# Patient Record
Sex: MALE | Race: WHITE | NOT HISPANIC OR LATINO | Employment: FULL TIME | URBAN - METROPOLITAN AREA
[De-identification: names, ages, dates, MRNs, and addresses within clinical notes are randomized per-mention and may not be internally consistent; named-entity substitution may affect disease eponyms.]

---

## 2018-11-28 ENCOUNTER — HOSPITAL ENCOUNTER (INPATIENT)
Facility: HOSPITAL | Age: 53
LOS: 2 days | Discharge: HOME | DRG: 390 | End: 2018-11-30
Attending: EMERGENCY MEDICINE | Admitting: SURGERY
Payer: COMMERCIAL

## 2018-11-28 ENCOUNTER — APPOINTMENT (EMERGENCY)
Dept: RADIOLOGY | Facility: HOSPITAL | Age: 53
DRG: 390 | End: 2018-11-28
Payer: COMMERCIAL

## 2018-11-28 ENCOUNTER — APPOINTMENT (EMERGENCY)
Dept: CT IMAGING | Facility: HOSPITAL | Age: 53
DRG: 390 | End: 2018-11-28
Payer: COMMERCIAL

## 2018-11-28 DIAGNOSIS — I10 HYPERTENSION, UNSPECIFIED TYPE: ICD-10-CM

## 2018-11-28 DIAGNOSIS — K56.609 SMALL BOWEL OBSTRUCTION (HCC): Primary | ICD-10-CM

## 2018-11-28 PROBLEM — G25.81 RLS (RESTLESS LEGS SYNDROME): Status: ACTIVE | Noted: 2018-11-28

## 2018-11-28 LAB
ALBUMIN SERPL BCP-MCNC: 4.4 G/DL (ref 3.5–5)
ALP SERPL-CCNC: 64 U/L (ref 46–116)
ALT SERPL W P-5'-P-CCNC: 50 U/L (ref 12–78)
AMORPH PHOS CRY URNS QL MICRO: ABNORMAL /HPF
ANION GAP SERPL CALCULATED.3IONS-SCNC: 13 MMOL/L (ref 4–13)
ANION GAP SERPL CALCULATED.3IONS-SCNC: 8 MMOL/L (ref 4–13)
APTT PPP: 26 SECONDS (ref 26–38)
AST SERPL W P-5'-P-CCNC: 31 U/L (ref 5–45)
ATRIAL RATE: 88 BPM
BACTERIA UR QL AUTO: ABNORMAL /HPF
BASOPHILS # BLD AUTO: 0.06 THOUSANDS/ΜL (ref 0–0.1)
BASOPHILS # BLD AUTO: 0.1 THOUSANDS/ΜL (ref 0–0.1)
BASOPHILS NFR BLD AUTO: 1 % (ref 0–1)
BASOPHILS NFR BLD AUTO: 1 % (ref 0–1)
BILIRUB SERPL-MCNC: 0.7 MG/DL (ref 0.2–1)
BILIRUB UR QL STRIP: NEGATIVE
BUN SERPL-MCNC: 13 MG/DL (ref 5–25)
BUN SERPL-MCNC: 14 MG/DL (ref 5–25)
CALCIUM SERPL-MCNC: 10.2 MG/DL (ref 8.3–10.1)
CALCIUM SERPL-MCNC: 9.2 MG/DL (ref 8.3–10.1)
CHLORIDE SERPL-SCNC: 100 MMOL/L (ref 100–108)
CHLORIDE SERPL-SCNC: 104 MMOL/L (ref 100–108)
CLARITY UR: CLEAR
CO2 SERPL-SCNC: 26 MMOL/L (ref 21–32)
CO2 SERPL-SCNC: 26 MMOL/L (ref 21–32)
COLOR UR: YELLOW
CREAT SERPL-MCNC: 0.91 MG/DL (ref 0.6–1.3)
CREAT SERPL-MCNC: 1 MG/DL (ref 0.6–1.3)
EOSINOPHIL # BLD AUTO: 0.02 THOUSAND/ΜL (ref 0–0.61)
EOSINOPHIL # BLD AUTO: 0.1 THOUSAND/ΜL (ref 0–0.61)
EOSINOPHIL NFR BLD AUTO: 0 % (ref 0–6)
EOSINOPHIL NFR BLD AUTO: 1 % (ref 0–6)
ERYTHROCYTE [DISTWIDTH] IN BLOOD BY AUTOMATED COUNT: 12.7 % (ref 11.6–15.1)
ERYTHROCYTE [DISTWIDTH] IN BLOOD BY AUTOMATED COUNT: 12.9 % (ref 11.6–15.1)
GFR SERPL CREATININE-BSD FRML MDRD: 85 ML/MIN/1.73SQ M
GFR SERPL CREATININE-BSD FRML MDRD: 95 ML/MIN/1.73SQ M
GLUCOSE SERPL-MCNC: 123 MG/DL (ref 65–140)
GLUCOSE SERPL-MCNC: 136 MG/DL (ref 65–140)
GLUCOSE UR STRIP-MCNC: NEGATIVE MG/DL
HCT VFR BLD AUTO: 49.3 % (ref 36.5–49.3)
HCT VFR BLD AUTO: 51.4 % (ref 36.5–49.3)
HGB BLD-MCNC: 16.9 G/DL (ref 12–17)
HGB BLD-MCNC: 18.3 G/DL (ref 12–17)
HGB UR QL STRIP.AUTO: NEGATIVE
IMM GRANULOCYTES # BLD AUTO: 0.03 THOUSAND/UL (ref 0–0.2)
IMM GRANULOCYTES # BLD AUTO: 0.03 THOUSAND/UL (ref 0–0.2)
IMM GRANULOCYTES NFR BLD AUTO: 0 % (ref 0–2)
IMM GRANULOCYTES NFR BLD AUTO: 0 % (ref 0–2)
INR PPP: 1.03 (ref 0.86–1.17)
KETONES UR STRIP-MCNC: NEGATIVE MG/DL
LACTATE SERPL-SCNC: 1.7 MMOL/L (ref 0.5–2)
LEUKOCYTE ESTERASE UR QL STRIP: NEGATIVE
LIPASE SERPL-CCNC: 105 U/L (ref 73–393)
LYMPHOCYTES # BLD AUTO: 1 THOUSANDS/ΜL (ref 0.6–4.47)
LYMPHOCYTES # BLD AUTO: 1.67 THOUSANDS/ΜL (ref 0.6–4.47)
LYMPHOCYTES NFR BLD AUTO: 11 % (ref 14–44)
LYMPHOCYTES NFR BLD AUTO: 8 % (ref 14–44)
MAGNESIUM SERPL-MCNC: 2.5 MG/DL (ref 1.6–2.6)
MCH RBC QN AUTO: 31 PG (ref 26.8–34.3)
MCH RBC QN AUTO: 31.4 PG (ref 26.8–34.3)
MCHC RBC AUTO-ENTMCNC: 34.3 G/DL (ref 31.4–37.4)
MCHC RBC AUTO-ENTMCNC: 35.6 G/DL (ref 31.4–37.4)
MCV RBC AUTO: 88 FL (ref 82–98)
MCV RBC AUTO: 90 FL (ref 82–98)
MONOCYTES # BLD AUTO: 0.52 THOUSAND/ΜL (ref 0.17–1.22)
MONOCYTES # BLD AUTO: 0.66 THOUSAND/ΜL (ref 0.17–1.22)
MONOCYTES NFR BLD AUTO: 4 % (ref 4–12)
MONOCYTES NFR BLD AUTO: 5 % (ref 4–12)
NEUTROPHILS # BLD AUTO: 11.07 THOUSANDS/ΜL (ref 1.85–7.62)
NEUTROPHILS # BLD AUTO: 12.38 THOUSANDS/ΜL (ref 1.85–7.62)
NEUTS SEG NFR BLD AUTO: 83 % (ref 43–75)
NEUTS SEG NFR BLD AUTO: 86 % (ref 43–75)
NITRITE UR QL STRIP: NEGATIVE
NON-SQ EPI CELLS URNS QL MICRO: ABNORMAL /HPF
NRBC BLD AUTO-RTO: 0 /100 WBCS
NRBC BLD AUTO-RTO: 0 /100 WBCS
P AXIS: 64 DEGREES
PH UR STRIP.AUTO: 8.5 [PH] (ref 4.5–8)
PLATELET # BLD AUTO: 241 THOUSANDS/UL (ref 149–390)
PLATELET # BLD AUTO: 279 THOUSANDS/UL (ref 149–390)
PMV BLD AUTO: 9.5 FL (ref 8.9–12.7)
PMV BLD AUTO: 9.6 FL (ref 8.9–12.7)
POTASSIUM SERPL-SCNC: 4 MMOL/L (ref 3.5–5.3)
POTASSIUM SERPL-SCNC: 4.3 MMOL/L (ref 3.5–5.3)
PR INTERVAL: 158 MS
PROT SERPL-MCNC: 9.1 G/DL (ref 6.4–8.2)
PROT UR STRIP-MCNC: ABNORMAL MG/DL
PROTHROMBIN TIME: 13.2 SECONDS (ref 11.8–14.2)
QRS AXIS: 74 DEGREES
QRSD INTERVAL: 96 MS
QT INTERVAL: 360 MS
QTC INTERVAL: 428 MS
RBC # BLD AUTO: 5.46 MILLION/UL (ref 3.88–5.62)
RBC # BLD AUTO: 5.82 MILLION/UL (ref 3.88–5.62)
RBC #/AREA URNS AUTO: ABNORMAL /HPF
SODIUM SERPL-SCNC: 138 MMOL/L (ref 136–145)
SODIUM SERPL-SCNC: 139 MMOL/L (ref 136–145)
SP GR UR STRIP.AUTO: 1.02 (ref 1–1.03)
T WAVE AXIS: 51 DEGREES
UROBILINOGEN UR QL STRIP.AUTO: 0.2 E.U./DL
VENTRICULAR RATE: 85 BPM
WBC # BLD AUTO: 12.84 THOUSAND/UL (ref 4.31–10.16)
WBC # BLD AUTO: 14.8 THOUSAND/UL (ref 4.31–10.16)
WBC #/AREA URNS AUTO: ABNORMAL /HPF

## 2018-11-28 PROCEDURE — 80053 COMPREHEN METABOLIC PANEL: CPT | Performed by: EMERGENCY MEDICINE

## 2018-11-28 PROCEDURE — 83605 ASSAY OF LACTIC ACID: CPT | Performed by: EMERGENCY MEDICINE

## 2018-11-28 PROCEDURE — 85730 THROMBOPLASTIN TIME PARTIAL: CPT | Performed by: EMERGENCY MEDICINE

## 2018-11-28 PROCEDURE — 83735 ASSAY OF MAGNESIUM: CPT | Performed by: SURGERY

## 2018-11-28 PROCEDURE — 93005 ELECTROCARDIOGRAM TRACING: CPT

## 2018-11-28 PROCEDURE — 85610 PROTHROMBIN TIME: CPT | Performed by: EMERGENCY MEDICINE

## 2018-11-28 PROCEDURE — 74177 CT ABD & PELVIS W/CONTRAST: CPT

## 2018-11-28 PROCEDURE — C9113 INJ PANTOPRAZOLE SODIUM, VIA: HCPCS | Performed by: SURGERY

## 2018-11-28 PROCEDURE — 93010 ELECTROCARDIOGRAM REPORT: CPT | Performed by: INTERNAL MEDICINE

## 2018-11-28 PROCEDURE — 71045 X-RAY EXAM CHEST 1 VIEW: CPT

## 2018-11-28 PROCEDURE — 96374 THER/PROPH/DIAG INJ IV PUSH: CPT

## 2018-11-28 PROCEDURE — 81001 URINALYSIS AUTO W/SCOPE: CPT

## 2018-11-28 PROCEDURE — 96375 TX/PRO/DX INJ NEW DRUG ADDON: CPT

## 2018-11-28 PROCEDURE — 83690 ASSAY OF LIPASE: CPT | Performed by: EMERGENCY MEDICINE

## 2018-11-28 PROCEDURE — 36415 COLL VENOUS BLD VENIPUNCTURE: CPT | Performed by: EMERGENCY MEDICINE

## 2018-11-28 PROCEDURE — 85025 COMPLETE CBC W/AUTO DIFF WBC: CPT | Performed by: EMERGENCY MEDICINE

## 2018-11-28 PROCEDURE — 99285 EMERGENCY DEPT VISIT HI MDM: CPT

## 2018-11-28 PROCEDURE — 96361 HYDRATE IV INFUSION ADD-ON: CPT

## 2018-11-28 PROCEDURE — 85025 COMPLETE CBC W/AUTO DIFF WBC: CPT | Performed by: SURGERY

## 2018-11-28 PROCEDURE — 96376 TX/PRO/DX INJ SAME DRUG ADON: CPT

## 2018-11-28 PROCEDURE — 80048 BASIC METABOLIC PNL TOTAL CA: CPT | Performed by: SURGERY

## 2018-11-28 RX ORDER — AMLODIPINE BESYLATE 5 MG/1
5 TABLET ORAL DAILY
Status: DISCONTINUED | OUTPATIENT
Start: 2018-11-28 | End: 2018-11-28

## 2018-11-28 RX ORDER — HYDROMORPHONE HCL/PF 1 MG/ML
0.5 SYRINGE (ML) INJECTION ONCE
Status: COMPLETED | OUTPATIENT
Start: 2018-11-28 | End: 2018-11-28

## 2018-11-28 RX ORDER — HYDROMORPHONE HCL/PF 1 MG/ML
1 SYRINGE (ML) INJECTION ONCE
Status: COMPLETED | OUTPATIENT
Start: 2018-11-28 | End: 2018-11-28

## 2018-11-28 RX ORDER — ROPINIROLE 5 MG/1
0.5 TABLET, FILM COATED ORAL 3 TIMES DAILY
COMMUNITY

## 2018-11-28 RX ORDER — HYDRALAZINE HYDROCHLORIDE 20 MG/ML
10 INJECTION INTRAMUSCULAR; INTRAVENOUS EVERY 6 HOURS PRN
Status: DISCONTINUED | OUTPATIENT
Start: 2018-11-28 | End: 2018-11-30 | Stop reason: HOSPADM

## 2018-11-28 RX ORDER — HYDROMORPHONE HCL/PF 1 MG/ML
0.5 SYRINGE (ML) INJECTION
Status: DISCONTINUED | OUTPATIENT
Start: 2018-11-28 | End: 2018-11-28

## 2018-11-28 RX ORDER — SODIUM CHLORIDE 9 MG/ML
125 INJECTION, SOLUTION INTRAVENOUS CONTINUOUS
Status: DISCONTINUED | OUTPATIENT
Start: 2018-11-28 | End: 2018-11-28 | Stop reason: HOSPADM

## 2018-11-28 RX ORDER — PANTOPRAZOLE SODIUM 40 MG/1
40 INJECTION, POWDER, FOR SOLUTION INTRAVENOUS
Status: DISCONTINUED | OUTPATIENT
Start: 2018-11-28 | End: 2018-11-30 | Stop reason: HOSPADM

## 2018-11-28 RX ORDER — METOPROLOL TARTRATE 5 MG/5ML
5 INJECTION INTRAVENOUS EVERY 8 HOURS PRN
Status: DISCONTINUED | OUTPATIENT
Start: 2018-11-28 | End: 2018-11-30 | Stop reason: HOSPADM

## 2018-11-28 RX ORDER — KETAMINE HYDROCHLORIDE 50 MG/ML
25 INJECTION, SOLUTION, CONCENTRATE INTRAMUSCULAR; INTRAVENOUS ONCE
Status: COMPLETED | OUTPATIENT
Start: 2018-11-28 | End: 2018-11-28

## 2018-11-28 RX ORDER — SODIUM CHLORIDE 9 MG/ML
125 INJECTION, SOLUTION INTRAVENOUS CONTINUOUS
Status: DISCONTINUED | OUTPATIENT
Start: 2018-11-28 | End: 2018-11-29

## 2018-11-28 RX ORDER — DOCUSATE SODIUM 100 MG/1
100 CAPSULE, LIQUID FILLED ORAL 2 TIMES DAILY PRN
Status: DISCONTINUED | OUTPATIENT
Start: 2018-11-28 | End: 2018-11-28

## 2018-11-28 RX ORDER — ONDANSETRON 2 MG/ML
4 INJECTION INTRAMUSCULAR; INTRAVENOUS EVERY 4 HOURS PRN
Status: DISCONTINUED | OUTPATIENT
Start: 2018-11-28 | End: 2018-11-30 | Stop reason: HOSPADM

## 2018-11-28 RX ORDER — DIAZEPAM 5 MG/ML
2.5 INJECTION, SOLUTION INTRAMUSCULAR; INTRAVENOUS EVERY 6 HOURS PRN
Status: DISCONTINUED | OUTPATIENT
Start: 2018-11-28 | End: 2018-11-30 | Stop reason: HOSPADM

## 2018-11-28 RX ORDER — ROPINIROLE 0.25 MG/1
0.5 TABLET, FILM COATED ORAL 3 TIMES DAILY
Status: DISCONTINUED | OUTPATIENT
Start: 2018-11-28 | End: 2018-11-28

## 2018-11-28 RX ORDER — LORAZEPAM 2 MG/ML
1 INJECTION INTRAMUSCULAR EVERY 4 HOURS PRN
Status: DISCONTINUED | OUTPATIENT
Start: 2018-11-28 | End: 2018-11-30 | Stop reason: HOSPADM

## 2018-11-28 RX ORDER — HYDROMORPHONE HCL/PF 1 MG/ML
0.5 SYRINGE (ML) INJECTION EVERY 4 HOURS PRN
Status: DISCONTINUED | OUTPATIENT
Start: 2018-11-28 | End: 2018-11-28

## 2018-11-28 RX ORDER — DIPHENHYDRAMINE HYDROCHLORIDE 50 MG/ML
25 INJECTION INTRAMUSCULAR; INTRAVENOUS EVERY 6 HOURS PRN
Status: DISCONTINUED | OUTPATIENT
Start: 2018-11-28 | End: 2018-11-30 | Stop reason: HOSPADM

## 2018-11-28 RX ORDER — HYDROMORPHONE HCL/PF 1 MG/ML
1 SYRINGE (ML) INJECTION
Status: DISCONTINUED | OUTPATIENT
Start: 2018-11-28 | End: 2018-11-30

## 2018-11-28 RX ORDER — MIDAZOLAM HYDROCHLORIDE 1 MG/ML
2 INJECTION INTRAMUSCULAR; INTRAVENOUS ONCE
Status: DISCONTINUED | OUTPATIENT
Start: 2018-11-28 | End: 2018-11-28

## 2018-11-28 RX ORDER — ONDANSETRON 2 MG/ML
4 INJECTION INTRAMUSCULAR; INTRAVENOUS ONCE
Status: COMPLETED | OUTPATIENT
Start: 2018-11-28 | End: 2018-11-28

## 2018-11-28 RX ORDER — AMLODIPINE BESYLATE 5 MG/1
5 TABLET ORAL DAILY
COMMUNITY

## 2018-11-28 RX ORDER — ACETAMINOPHEN 325 MG/1
650 TABLET ORAL EVERY 6 HOURS PRN
Status: DISCONTINUED | OUTPATIENT
Start: 2018-11-28 | End: 2018-11-30 | Stop reason: HOSPADM

## 2018-11-28 RX ADMIN — HYDROMORPHONE HYDROCHLORIDE 0.5 MG: 1 INJECTION, SOLUTION INTRAMUSCULAR; INTRAVENOUS; SUBCUTANEOUS at 01:44

## 2018-11-28 RX ADMIN — HYDROMORPHONE HYDROCHLORIDE 0.5 MG: 1 INJECTION, SOLUTION INTRAMUSCULAR; INTRAVENOUS; SUBCUTANEOUS at 01:08

## 2018-11-28 RX ADMIN — LORAZEPAM 1 MG: 2 INJECTION INTRAMUSCULAR; INTRAVENOUS at 08:42

## 2018-11-28 RX ADMIN — IOHEXOL 100 ML: 350 INJECTION, SOLUTION INTRAVENOUS at 02:56

## 2018-11-28 RX ADMIN — HYDROMORPHONE HYDROCHLORIDE 1 MG: 1 INJECTION, SOLUTION INTRAMUSCULAR; INTRAVENOUS; SUBCUTANEOUS at 22:20

## 2018-11-28 RX ADMIN — ONDANSETRON 4 MG: 2 INJECTION INTRAMUSCULAR; INTRAVENOUS at 01:07

## 2018-11-28 RX ADMIN — PANTOPRAZOLE SODIUM 40 MG: 40 INJECTION, POWDER, FOR SOLUTION INTRAVENOUS at 08:42

## 2018-11-28 RX ADMIN — HYDROMORPHONE HYDROCHLORIDE 1 MG: 1 INJECTION, SOLUTION INTRAMUSCULAR; INTRAVENOUS; SUBCUTANEOUS at 02:08

## 2018-11-28 RX ADMIN — HYDROMORPHONE HYDROCHLORIDE 0.5 MG: 1 INJECTION, SOLUTION INTRAMUSCULAR; INTRAVENOUS; SUBCUTANEOUS at 07:51

## 2018-11-28 RX ADMIN — HYDROMORPHONE HYDROCHLORIDE 1 MG: 1 INJECTION, SOLUTION INTRAMUSCULAR; INTRAVENOUS; SUBCUTANEOUS at 17:06

## 2018-11-28 RX ADMIN — ENOXAPARIN SODIUM 40 MG: 40 INJECTION SUBCUTANEOUS at 08:42

## 2018-11-28 RX ADMIN — KETAMINE HYDROCHLORIDE 25 MG: 50 INJECTION, SOLUTION INTRAMUSCULAR; INTRAVENOUS at 04:06

## 2018-11-28 RX ADMIN — SODIUM CHLORIDE 125 ML/HR: 0.9 INJECTION, SOLUTION INTRAVENOUS at 07:53

## 2018-11-28 RX ADMIN — Medication 2 SPRAY: at 08:44

## 2018-11-28 RX ADMIN — TOPICAL ANESTHETIC 2 SPRAY: 200 SPRAY DENTAL; PERIODONTAL at 03:54

## 2018-11-28 RX ADMIN — SODIUM CHLORIDE 125 ML/HR: 0.9 INJECTION, SOLUTION INTRAVENOUS at 02:20

## 2018-11-28 RX ADMIN — Medication 2 SPRAY: at 17:12

## 2018-11-28 RX ADMIN — HYDROMORPHONE HYDROCHLORIDE 1 MG: 1 INJECTION, SOLUTION INTRAMUSCULAR; INTRAVENOUS; SUBCUTANEOUS at 09:52

## 2018-11-28 RX ADMIN — HYDROMORPHONE HYDROCHLORIDE 1 MG: 1 INJECTION, SOLUTION INTRAMUSCULAR; INTRAVENOUS; SUBCUTANEOUS at 13:09

## 2018-11-28 RX ADMIN — HYDROMORPHONE HYDROCHLORIDE 1 MG: 1 INJECTION, SOLUTION INTRAMUSCULAR; INTRAVENOUS; SUBCUTANEOUS at 09:51

## 2018-11-28 RX ADMIN — IOHEXOL 50 ML: 240 INJECTION, SOLUTION INTRATHECAL; INTRAVASCULAR; INTRAVENOUS; ORAL at 01:35

## 2018-11-28 RX ADMIN — SODIUM CHLORIDE 1000 ML: 0.9 INJECTION, SOLUTION INTRAVENOUS at 01:07

## 2018-11-28 RX ADMIN — HYDROMORPHONE HYDROCHLORIDE 1 MG: 1 INJECTION, SOLUTION INTRAMUSCULAR; INTRAVENOUS; SUBCUTANEOUS at 03:53

## 2018-11-28 RX ADMIN — METOPROLOL TARTRATE 5 MG: 1 INJECTION, SOLUTION INTRAVENOUS at 07:51

## 2018-11-28 NOTE — ASSESSMENT & PLAN NOTE
Patient experiencing episodes of accelerated hypertension secondary to pain  PRN Hydralazine for SBP > 180   Resume norvasc when allowed to take PO  Patient reports blood pressure is usually controlled at home and he is compliant with follow-up

## 2018-11-28 NOTE — ASSESSMENT & PLAN NOTE
Management per primary surgical team   NPO , IVF , Pain Control, Anti-emetics   Clinically improved overnight, patient is hopeful for clamping trial today and removal of NG tube as soon as possible

## 2018-11-28 NOTE — UTILIZATION REVIEW
Initial Clinical Review    Admission: Date/Time/Statement: 11/28/18 @ 0416     Orders Placed This Encounter   Procedures    Inpatient Admission (expected length of stay for this patient is greater than two midnights)     Standing Status:   Standing     Number of Occurrences:   1     Order Specific Question:   Admitting Physician     Answer:   Stephen Munoz [[id number]]     Order Specific Question:   Level of Care     Answer:   Med Surg [[id number]]     Order Specific Question:   Estimated length of stay     Answer:   More than 2 Midnights     Order Specific Question:   Certification     Answer:   I certify that inpatient services are medically necessary for this patient for a duration of greater than two midnights  See H&P and MD Progress Notes for additional information about the patient's course of treatment  ED: Date/Time/Mode of Arrival:   ED Arrival Information     Expected Arrival Acuity Means of Arrival Escorted By Service Admission Type    - 11/28/2018 00:39 Urgent Walk-In Self Surgery-General Urgent    Arrival Complaint    Abdominal pain          Chief Complaint:   Chief Complaint   Patient presents with    Abdominal Pain     patient comes in with c/o abdominal pain that started about 6pm  Patient states at work he ate a bunch of fruit for lunch and the pain progessively got worse 10/10 pain  Patient states the pain is in the LLQ and RLQ  Patient states he was a little nauseas at one point and did vomit one time just a little bit  No diarrhea       History of Illness: 46 y o  male who presents with abdominal pain of 1 day duration  Patient states the pain began suddenly last night  Associated with nausea  No vomiting  His last bowel movement was small and was noted to be yesterday evening  The patient does have multiple colon resections in the past all done by Dr Maria E Adan at [facility]  The patient had his colon resections for diverticulitis    The patient has a history of small-bowel obstructions most recently admitted medial and were hospital   These resolve spontaneously      Patient was seen in the emergency department where an NG tube was placed  A 1 L of contrast and gastric contents was evacuated upon insertion  The patient had an additional 500 cc removed from his NG tube since he has been admitted to the floor  Patient denies any passing of gas  His last bowel movement was last night he does report crampy abdominal pain located in his lower abdomen    ED Vital Signs:   ED Triage Vitals [11/28/18 0051]   Temperature Pulse Respirations Blood Pressure SpO2   98 8 °F (37 1 °C) (!) 109 22 (!) 173/98 98 %      Temp Source Heart Rate Source Patient Position - Orthostatic VS BP Location FiO2 (%)   Oral Monitor Sitting Right arm --      Pain Score       Worst Possible Pain        Wt Readings from Last 1 Encounters:   11/28/18 129 kg (284 lb 6 3 oz)       Vital Signs (abnormal): /98 - 180/110    Abnormal Labs/Diagnostic Test Results:   Calcium 10 2    Total protein 9 1   Wbc 14 80, hgb 18 3, hct 51 4  Ct abdomen - There is small bowel obstruction   The transition zone appears to be within the left hemipelvis   Please see discussion   Surgical consultation is recommended  The prostate is prominent, measuring approximately 6 cm transverse dimension   Clinical and laboratory correlation is recommended    Mild hepatomegaly with mild fatty infiltration of the liver    ED Treatment: NGT to low suction  Medication Administration from 11/28/2018 0039 to 11/28/2018 0440       Date/Time Order Dose Route Action Comments     11/28/2018 0219 sodium chloride 0 9 % bolus 1,000 mL 0 mL Intravenous Stopped      11/28/2018 0107 sodium chloride 0 9 % bolus 1,000 mL 1,000 mL Intravenous New Bag      11/28/2018 0220 sodium chloride 0 9 % infusion 125 mL/hr Intravenous New Bag      11/28/2018 0108 HYDROmorphone (DILAUDID) injection 0 5 mg 0 5 mg Intravenous Given      11/28/2018 0107 ondansetron (Harry Mendieta) injection 4 mg 4 mg Intravenous Given      11/28/2018 0135 iohexol (OMNIPAQUE) 240 MG/ML solution 50 mL 50 mL Oral Given      11/28/2018 0144 HYDROmorphone (DILAUDID) injection 0 5 mg 0 5 mg Intravenous Given      11/28/2018 7901 HYDROmorphone (DILAUDID) injection 1 mg 1 mg Intravenous Given      11/28/2018 0256 iohexol (OMNIPAQUE) 350 MG/ML injection (MULTI-DOSE) 100 mL 100 mL Intravenous Given      11/28/2018 0353 HYDROmorphone (DILAUDID) injection 1 mg 1 mg Intravenous Given      11/28/2018 0354 midazolam (VERSED) injection 2 mg 2 mg Intravenous Not Given      11/28/2018 0354 benzocaine (HURRICAINE) 20 % mucosal spray 2 spray 2 spray Mucosal Given      11/28/2018 0406 ketamine (KETALAR) 50 mg/mL 25 mg 25 mg Intravenous Given by Other given by mari case PA-C          Past Medical/Surgical History:   Past Medical History:   Diagnosis Date    Diverticulitis        Admitting Diagnosis: Small bowel obstruction ([address] ) [K56 609]  Abdominal pain [R10 9]    Age/Sex: 46 y o  male    Assessment/Plan: 58-year-old male with small-bowel obstruction     NG tube in place 8, estimated output over last 24 hours approximately 1 5 L     Plan:  Continue NPO NG tube  IV fluids  Monitor electrolytes  Will consult him for management of hypertension  Pain control with IV narcotics and Valium    Admission Orders:  11/28/2018  0417 INPATIENT   Scheduled Meds:   Current Facility-Administered Medications:  acetaminophen 650 mg Oral Q6H PRN    diazepam 2 5 mg Intravenous Q6H PRN    diphenhydrAMINE 25 mg Intravenous Q6H PRN    enoxaparin 40 mg Subcutaneous Daily    hydrALAZINE 10 mg Intravenous Q6H PRN    HYDROmorphone 1 mg Intravenous Q3H PRN    LORazepam 1 mg Intravenous Q4H PRN    metoprolol 5 mg Intravenous Q8H PRN    nicotine 1 patch Transdermal Daily    ondansetron 4 mg Intravenous Q4H PRN    pantoprazole 40 mg Intravenous Q24H DANIEL    phenol 2 spray Mouth/Throat Q2H PRN    sodium chloride 125 mL/hr Intravenous Continuous Last Rate: 125 mL/hr (11/28/18 0753)     Continuous Infusions:   sodium chloride 125 mL/hr Last Rate: 125 mL/hr (11/28/18 0753)     PRN Meds:    HYDROmorphone 0 5 I v - used x 1       LORazepam  1 mg iv - used x 1      Metoprolol  5 mg iv - used x 1      Phenol - used x 1  OTHER ORDERS:  scds  NGT to low continuous suction  NPO  Consult medicine  Respiratory protocol      [address]  Utilization Review Department  Phone: [phone]; Fax [phone]  [email or web address]  ATTENTION: Please call with any questions or concerns to [phone]  and carefully listen to the prompts so that you are directed to the right person  Send all requests for admission clinical reviews, approved or denied determinations and any other requests to fax [phone]   All voicemails are confidential

## 2018-11-28 NOTE — ED PROVIDER NOTES
History  Chief Complaint   Patient presents with    Abdominal Pain     patient comes in with c/o abdominal pain that started about 6pm  Patient states at work he ate a bunch of fruit for lunch and the pain progessively got worse 10/10 pain  Patient states the pain is in the LLQ and RLQ  Patient states he was a little nauseas at one point and did vomit one time just a little bit  No diarrhea     Patient is a 46year old male with constant worsening lower abdominal pain with N/V since last night at 1800  No constipation or diarrhea  No GI bleeding  No urinary sx  Has a h/o diverticulitis and has had multiple prior surgeries for this  Has had bowel obstruction in the past as well and has seen Dr Martha Delacruz at [facility] last  No fever  States he drove here  No recent old records from this ED seen on [employer] system  [facility] website checked on this patient and no Rx found  History provided by:  Patient   used: No    Abdominal Pain   Associated symptoms: nausea and vomiting    Associated symptoms: no constipation, no diarrhea and no fever        Prior to Admission Medications   Prescriptions Last Dose Informant Patient Reported? Taking? amLODIPine (NORVASC) 5 mg tablet   Yes Yes   Sig: Take 5 mg by mouth daily   rOPINIRole (REQUIP) 5 MG tablet   Yes Yes   Sig: Take 0 5 mg by mouth 3 (three) times a day      Facility-Administered Medications: None       Past Medical History:   Diagnosis Date    Diverticulitis        Past Surgical History:   Procedure Laterality Date    ABDOMINAL SURGERY      COLON SURGERY         History reviewed  No pertinent family history  I have reviewed and agree with the history as documented  Social History   Substance Use Topics    Smoking status: Former Smoker    Smokeless tobacco: Current User    Alcohol use No        Review of Systems   Constitutional: Negative for fever  Gastrointestinal: Positive for abdominal pain, nausea and vomiting   Negative for blood in stool, constipation and diarrhea  Genitourinary: Negative for difficulty urinating  All other systems reviewed and are negative  Physical Exam  Physical Exam   Constitutional: He is oriented to person, place, and time  He appears well-developed and well-nourished  He appears distressed (moderate)  HENT:   Head: Normocephalic and atraumatic  Mucous membranes somewhat moist     Eyes: No scleral icterus  Neck: No tracheal deviation present  Cardiovascular: Regular rhythm and normal heart sounds  No murmur heard  Tachycardia  Pulmonary/Chest: Breath sounds normal  No stridor  No respiratory distress  He has no wheezes  He has no rales  Mild tachypnea  Abdominal: Soft  Bowel sounds are normal  He exhibits no distension  There is tenderness (diffuse lower)  There is no rebound and no guarding  Well healed midline scar  Musculoskeletal: He exhibits no edema or deformity  Neurological: He is alert and oriented to person, place, and time  Skin: Skin is warm and dry  No rash noted  Psychiatric:   Anxious  Nursing note and vitals reviewed        Vital Signs  ED Triage Vitals [11/28/18 0051]   Temperature Pulse Respirations Blood Pressure SpO2   98 8 °F (37 1 °C) (!) 109 22 (!) 173/98 98 %      Temp Source Heart Rate Source Patient Position - Orthostatic VS BP Location FiO2 (%)   Oral Monitor Sitting Right arm --      Pain Score       Worst Possible Pain           Vitals:    11/28/18 0136 11/28/18 0340 11/28/18 0345 11/28/18 0407   BP: 145/93 161/85 161/85 (!) 211/107   Pulse: 84 86  (!) 111   Patient Position - Orthostatic VS: Lying Lying  Lying       Visual Acuity      ED Medications  Medications   sodium chloride 0 9 % infusion (125 mL/hr Intravenous New Bag 11/28/18 0220)   sodium chloride 0 9 % bolus 1,000 mL (0 mL Intravenous Stopped 11/28/18 0219)   HYDROmorphone (DILAUDID) injection 0 5 mg (0 5 mg Intravenous Given 11/28/18 0108)   ondansetron (ZOFRAN) injection 4 mg (4 mg Intravenous Given 11/28/18 0107)   iohexol (OMNIPAQUE) 240 MG/ML solution 50 mL (50 mL Oral Given 11/28/18 0135)   HYDROmorphone (DILAUDID) injection 0 5 mg (0 5 mg Intravenous Given 11/28/18 0144)   HYDROmorphone (DILAUDID) injection 1 mg (1 mg Intravenous Given 11/28/18 0208)   iohexol (OMNIPAQUE) 350 MG/ML injection (MULTI-DOSE) 100 mL (100 mL Intravenous Given 11/28/18 0256)   HYDROmorphone (DILAUDID) injection 1 mg (1 mg Intravenous Given 11/28/18 0353)   benzocaine (HURRICAINE) 20 % mucosal spray 2 spray (2 sprays Mucosal Given 11/28/18 0354)   ketamine (KETALAR) 50 mg/mL 25 mg (25 mg Intravenous Given by Other 11/28/18 0406)       Diagnostic Studies  Results Reviewed     Procedure Component Value Units Date/Time    Urine Microscopic [[id number]]  (Abnormal) Collected:  11/28/18 0137    Lab Status:  Final result Specimen:  Urine Updated:  11/28/18 0201     RBC, UA None Seen /hpf      WBC, UA 0-1 (A) /hpf      Epithelial Cells None Seen /hpf      Bacteria, UA Occasional /hpf      AMORPH PHOSPATES Moderate /hpf     Lactic acid, plasma [[id number]]  (Normal) Collected:  11/28/18 0110    Lab Status:  Final result Specimen:  Blood from Arm, Right Updated:  11/28/18 0143     LACTIC ACID 1 7 mmol/L     Narrative:         Result may be elevated if tourniquet was used during collection      ED Urine Macroscopic [[id number]]  (Abnormal) Collected:  11/28/18 0137    Lab Status:  Final result Specimen:  Urine Updated:  11/28/18 0141     Color, UA Yellow     Clarity, UA Clear     pH, UA 8 5 (H)     Leukocytes, UA Negative     Nitrite, UA Negative     Protein, UA 30 (1+) (A) mg/dl      Glucose, UA Negative mg/dl      Ketones, UA Negative mg/dl      Urobilinogen, UA 0 2 E U /dl      Bilirubin, UA Negative     Blood, UA Negative     Specific Gravity, UA 1 020    Narrative:       CLINITEK RESULT    Comprehensive metabolic panel [[id number]]  (Abnormal) Collected:  11/28/18 0110    Lab Status:  Final result Specimen:  Blood from Arm, Right Updated:  11/28/18 0135     Sodium 139 mmol/L      Potassium 4 0 mmol/L      Chloride 100 mmol/L      CO2 26 mmol/L      ANION GAP 13 mmol/L      BUN 14 mg/dL      Creatinine 1 00 mg/dL      Glucose 136 mg/dL      Calcium 10 2 (H) mg/dL      AST 31 U/L      ALT 50 U/L      Alkaline Phosphatase 64 U/L      Total Protein 9 1 (H) g/dL      Albumin 4 4 g/dL      Total Bilirubin 0 70 mg/dL      eGFR 85 ml/min/1 73sq m     Narrative:         National Kidney Disease Education Program recommendations are as follows:  GFR calculation is accurate only with a steady state creatinine  Chronic Kidney disease less than 60 ml/min/1 73 sq  meters  Kidney failure less than 15 ml/min/1 73 sq  meters      Lipase [[id number]]  (Normal) Collected:  11/28/18 0110    Lab Status:  Final result Specimen:  Blood from Arm, Right Updated:  11/28/18 0135     Lipase 105 u/L     Protime-INR [[id number]]  (Normal) Collected:  11/28/18 0110    Lab Status:  Final result Specimen:  Blood from Arm, Right Updated:  11/28/18 0129     Protime 13 2 seconds      INR 1 03    APTT [[id number]]  (Normal) Collected:  11/28/18 0110    Lab Status:  Final result Specimen:  Blood from Arm, Right Updated:  11/28/18 0129     PTT 26 seconds     CBC and differential [[id number]]  (Abnormal) Collected:  11/28/18 0110    Lab Status:  Final result Specimen:  Blood from Arm, Right Updated:  11/28/18 0116     WBC 14 80 (H) Thousand/uL      RBC 5 82 (H) Million/uL      Hemoglobin 18 3 (H) g/dL      Hematocrit 51 4 (H) %      MCV 88 fL      MCH 31 4 pg      MCHC 35 6 g/dL      RDW 12 9 %      MPV 9 5 fL      Platelets 133 Thousands/uL      nRBC 0 /100 WBCs      Neutrophils Relative 83 (H) %      Immat GRANS % 0 %      Lymphocytes Relative 11 (L) %      Monocytes Relative 4 %      Eosinophils Relative 1 %      Basophils Relative 1 %      Neutrophils Absolute 12 38 (H) Thousands/µL      Immature Grans Absolute 0 03 Thousand/uL      Lymphocytes Absolute 1 67 Thousands/µL Monocytes Absolute 0 52 Thousand/µL      Eosinophils Absolute 0 10 Thousand/µL      Basophils Absolute 0 10 Thousands/µL                  CT abdomen pelvis with contrast   ED Interpretation by Jaswinder Steward MD (11/28 0341)   FINDINGS:      ABDOMEN      LOWER CHEST:  Mild dependent changes are present  LIVER/BILIARY TREE:  The liver is mildly enlarged, measuring approximately 19 cm craniocaudal dimension   There is mild fatty infiltration of the liver  GALLBLADDER:  No calcified gallstones  No pericholecystic inflammatory change  SPLEEN:  Unremarkable  PANCREAS:  Unremarkable  ADRENAL GLANDS:  Unremarkable  KIDNEYS/URETERS:  Unremarkable  No hydronephrosis  STOMACH AND BOWEL:  The stomach is distended   There are multiple loops of mildly dilated fluid-filled small bowel in the left upper to mid abdomen, extending into the left hemipelvis   These measure up to 3 8 cm diameter   Numerous air-fluid levels are    present   Several of the small bowel loops demonstrate rather abrupt kinking, suggesting adhesions   The transition zone appears to be within the left hemipelvis   Some of the dilated small bowel loops demonstrate wall enhancement   This is most notable    in the left hemipelvis   There is also some fecalization of small bowel contents in the left hemipelvis and there is some infiltration of the adjacent mesenteric fat and a small amount of fluid adjacent   The distal small bowel and colon are not dilated      A surgical anastomosis is present in the sigmoid  Alcides Merino is no evidence of acute diverticulitis  APPENDIX:  No findings to suggest appendicitis  ABDOMINOPELVIC CAVITY:  As described above   No evidence of pneumoperitoneum  VESSELS: Alcides Merino is atherosclerosis of the abdominal aorta and branch vessels   There is no evidence of abdominal aortic aneurysm        PELVIS      REPRODUCTIVE ORGANS:  The prostate measures approximately 6 cm transverse dimension   Clinical and laboratory correlation is recommended  URINARY BLADDER:  Unremarkable  ABDOMINAL WALL/INGUINAL REGIONS:  Unremarkable  OSSEOUS STRUCTURES:  No acute fracture or destructive osseous lesion  Impression:        There is small bowel obstruction   The transition zone appears to be within the left hemipelvis   Please see discussion   Surgical consultation is recommended  The prostate is prominent, measuring approximately 6 cm transverse dimension   Clinical and laboratory correlation is recommended  Mild hepatomegaly with mild fatty infiltration of the liver              I personally discussed this study with [address] [place] [address] on 11/28/2018 at 3:36 AM                       Workstation performed: [id number]         Final Result by Christo Hicks MD (11/28 9144)      There is small bowel obstruction  The transition zone appears to be within the left hemipelvis  Please see discussion  Surgical consultation is recommended  The prostate is prominent, measuring approximately 6 cm transverse dimension  Clinical and laboratory correlation is recommended  Mild hepatomegaly with mild fatty infiltration of the liver  I personally discussed this study with [address] [place] [address] on 11/28/2018 at 3:36 AM                       Workstation performed: [id number]         XR chest 1 view portable   ED Interpretation by Harpal Toth MD (11/28 9974)   No acute disease read by me                    Procedures  ECG 12 Lead Documentation  Date/Time: 11/28/2018 1:21 AM  Performed by: Jacquie Torre  Authorized by: Jacquie Torre     Indications / Diagnosis:  Abdominal pain  ECG reviewed by me, the ED Provider: yes    Patient location:  ED  Previous ECG:     Previous ECG:  Compared to current    Comparison ECG info:  1/5/13    Similarity:  Changes noted (not s  tachy now)  Rate:     ECG rate:  85    ECG rate assessment: normal    Rhythm:     Rhythm: sinus rhythm    Ectopy:     Ectopy: none    QRS:     QRS axis:  Normal    QRS intervals:  Normal  Conduction:     Conduction: normal    ST segments:     ST segments:  Normal  T waves:     T waves: normal    Other findings:     Other findings: poor R wave progression    Comments:      I do not agree with computer reading of cannot rule out anterior infarct age undetermined  Phone Contacts  ED Phone Contact    ED Course  ED Course as of Nov 28 0417 Wed Nov 28, 2018   0141 Labs d/w patient  Pain somewhat improving but still with pain so more IV dilaudid ordered  6437 Patient still with pain so more IV dilaudid ordered  1721 CT d/w patient  NGT ordered  More IV dilaudid ordered for pain  0416 NGT placed with about 1 liter of output  MDM  Number of Diagnoses or Management Options  Diagnosis management comments: DDx including but not limited to: appendicitis, gastroenteritis, gastritis, PUD, GERD, gastroparesis, hepatitis, pancreatitis, colitis, enteritis, diverticulitis, food poisoning, mesenteric adenitis, mesenteric ischemia, IBD, IBS, ileus, bowel obstruction, volvulus, internal hernia, cholecystitis, biliary colic, choledocholithiasis, perforated viscus, splenic etiology, constipation, AAA, renal colic, pyelonephritis, UTI; doubt cardiac etiology  CritCare Time    Disposition  Final diagnoses:   Small bowel obstruction ([address] )     Time reflects when diagnosis was documented in both MDM as applicable and the Disposition within this note     Time User Action Codes Description Comment    11/28/2018  4:15 AM Jose Corona Add [K56 609] Small bowel obstruction [facility])       ED Disposition     ED Disposition Condition Comment    Admit  Case was discussed with surgical resident and the patient's admission status was agreed to be Admission Status: inpatient status to the service of Dr An Newby           Follow-up Information    None         Patient's Medications Discharge Prescriptions    No medications on file     No discharge procedures on file      ED Provider  Electronically Signed by           Ras Cheema MD  11/28/18 5829

## 2018-11-28 NOTE — H&P
H&P Exam - General Surgery   Bebe Smith 46 y o  male MRN: [id number]  Unit/Bed#: -01 Encounter: [id number]    Assessment/Plan     Assessment:  27-year-old male with small-bowel obstruction    NG tube in place 8, estimated output over last 24 hours approximately 1 5 L    Plan:  Continue NPO NG tube  IV fluids  Monitor electrolytes  Will consult him for management of hypertension  Pain control with IV narcotics and Valium      History of Present Illness     HPI:  Bebe Smith is a 46 y o  male who presents with abdominal pain of 1 day duration  Patient states the pain began suddenly last night  Associated with nausea  No vomiting  His last bowel movement was small and was noted to be yesterday evening  The patient does have multiple colon resections in the past all done by Dr Ivan Bryan at [facility]  The patient had his colon resections for diverticulitis  The patient has a history of small-bowel obstructions most recently admitted medial and were hospital   These resolve spontaneously  Patient was seen in the emergency department where an NG tube was placed  A 1 L of contrast and gastric contents was evacuated upon insertion  The patient had an additional 500 cc removed from his NG tube since he has been admitted to the floor    Patient denies any passing of gas  His last bowel movement was last night he does report crampy abdominal pain located in his lower abdomen    Review of Systems   Constitutional: Negative for chills and fever  HENT: Negative for congestion  Respiratory: Negative for cough and shortness of breath  Cardiovascular: Negative for chest pain  Gastrointestinal: Positive for abdominal distention, abdominal pain and nausea  Genitourinary: Negative for dysuria  Neurological: Negative for seizures and numbness  Psychiatric/Behavioral: Negative for agitation and confusion         Historical Information   Past Medical History:   Diagnosis Date    Diverticulitis      Past Surgical History:   Procedure Laterality Date    ABDOMINAL SURGERY      COLON SURGERY       Social History   History   Alcohol Use No     History   Drug Use No     History   Smoking Status    Former Smoker   Smokeless Tobacco    Current User     Family History: non-contributory    Meds/Allergies   all medications and allergies reviewed  No Known Allergies    Objective   First Vitals:   Blood Pressure: (!) 173/98 (11/28/18 0051)  Pulse: (!) 109 (11/28/18 0051)  Temperature: 98 8 °F (37 1 °C) (11/28/18 0051)  Temp Source: Oral (11/28/18 0051)  Respirations: 22 (11/28/18 0051)  Height: 6' (182 9 cm) (11/28/18 0455)  Weight - Scale: 129 kg (285 lb) (11/28/18 0051)  SpO2: 98 % (11/28/18 0051)    Current Vitals:   Blood Pressure: (!) 180/110 (11/28/18 0700)  Pulse: 77 (11/28/18 0700)  Temperature: 98 1 °F (36 7 °C) (11/28/18 0700)  Temp Source: Oral (11/28/18 0700)  Respirations: 20 (11/28/18 0700)  Height: 6' (182 9 cm) (11/28/18 0455)  Weight - Scale: 129 kg (284 lb 6 3 oz) (11/28/18 0600)  SpO2: 95 % (11/28/18 0700)      Intake/Output Summary (Last 24 hours) at 11/28/18 0827  Last data filed at 11/28/18 9678   Gross per 24 hour   Intake             1000 ml   Output              900 ml   Net              100 ml       Invasive Devices     Peripheral Intravenous Line            Peripheral IV 11/28/18 Right Antecubital less than 1 day          Drain            NG/OG/Enteral Tube Nasogastric 16 Fr Left nares less than 1 day                Physical Exam   Constitutional: He appears well-developed and well-nourished  HENT:   Head: Normocephalic  Eyes: Pupils are equal, round, and reactive to light  Neck: Normal range of motion  Neck supple  Cardiovascular: Normal rate and regular rhythm  Pulmonary/Chest: Effort normal    Abdominal:   Abdomen distended, hyperactive bowel sounds + tender in lower quadrants  SOFT  No peritonitis    Neurological: He is alert  Skin: Skin is warm and dry  Lab Results:   I have personally reviewed pertinent lab results  , CBC:   Lab Results   Component Value Date    WBC 12 84 (H) 11/28/2018    HGB 16 9 11/28/2018    HCT 49 3 11/28/2018    MCV 90 11/28/2018     11/28/2018    MCH 31 0 11/28/2018    MCHC 34 3 11/28/2018    RDW 12 7 11/28/2018    MPV 9 6 11/28/2018    NRBC 0 11/28/2018   , CMP:   Lab Results   Component Value Date    SODIUM 138 11/28/2018    K 4 3 11/28/2018     11/28/2018    CO2 26 11/28/2018    BUN 13 11/28/2018    CREATININE 0 91 11/28/2018    CALCIUM 9 2 11/28/2018    AST 31 11/28/2018    ALT 50 11/28/2018    ALKPHOS 64 11/28/2018    EGFR 95 11/28/2018   , Coagulation:   Lab Results   Component Value Date    INR 1 03 11/28/2018     Imaging: I have personally reviewed pertinent reports  EKG, Pathology, and Other Studies: I have personally reviewed pertinent reports  Code Status: Level 1 - Full Code  Advance Directive and Living Will:      Power of :    POLST:      Counseling / Coordination of Care  Total floor / unit time spent today 30 minutes  Greater than 50% of total time was spent with the patient and / or family counseling and / or coordination of care  A description of the counseling / coordination of care: 30

## 2018-11-28 NOTE — PROCEDURES
The patient was given 25mg Ketamine, IV by provider  Patient remained hemodynamically stable through the duration of the procedure  A 16f NG tube was passed into the left nare, and advanced until the pre-determined distance was reached  Placement was verified by the suctioning of stomach contents, as well as the presence of positive gastric bolus sounds  The patient tolerated the procedure well, with no noted complications  The provider remained at the bedside for the duration of the 20 minute procedure  The patient was noted to return to baseline at the completion of the procedure

## 2018-11-28 NOTE — CONSULTS
Consultation - Tasha Tran 46 y o  male MRN: [id number]    Unit/Bed#: -01 Encounter: [id number]      Assessment/Plan     * SBO (small bowel obstruction) ([address] )   Assessment & Plan    Management per primary surgical team   NPO , IVF , Pain Control, Anti-emetics      RLS (restless legs syndrome)   Assessment & Plan    Hold requip until patient able to take PO      Essential hypertension   Assessment & Plan    Patient experiencing episodes of accelerated hypertension secondary to pain  Will utilize PRN Hydralazine for SBP > 180   If BP persistently elevated consider placing patient ATC medication            History of Present Illness     HPI: Tasha Tarn is a 46y o  year old male who presents with 1 day history of abdominal pain  Abdominal pain is initially associated with nausea but no vomiting  Patient has a history of small-bowel obstruction with multiple colon resections in the past done by Dr Simon  In the emergency room and patient had an NG tube placed with return of approximately 1 L of contrast   On exam the patient is uncomfortable with significant output via NG  We have been consulted for management of patient's non surgical issues including restless leg syndrome and essential hypertension  Suspect that patient's elevated blood pressure is due to pain    Inpatient consult to Internal Medicine  Consult performed by: Alma Longo ordered by: Sari Wheat          Review of Systems   Gastrointestinal: Positive for abdominal pain and nausea  All other systems reviewed and are negative        Historical Information   Past Medical History:   Diagnosis Date    Diverticulitis      Past Surgical History:   Procedure Laterality Date    ABDOMINAL SURGERY      COLON SURGERY       Social History   History   Alcohol Use No     History   Drug Use No     History   Smoking Status    Former Smoker   Smokeless Tobacco    Current User     Family History: non-contributory    Meds/Allergies   all current active meds have been reviewed  No Known Allergies    Objective   Vitals: Blood pressure (!) 180/110, pulse 77, temperature 98 1 °F (36 7 °C), temperature source Oral, resp  rate 20, height 6' (1 829 m), weight 129 kg (284 lb 6 3 oz), SpO2 95 %  Intake/Output Summary (Last 24 hours) at 11/28/18 0956  Last data filed at 11/28/18 1122   Gross per 24 hour   Intake             1000 ml   Output              900 ml   Net              100 ml     Invasive Devices     Peripheral Intravenous Line            Peripheral IV 11/28/18 Right Antecubital less than 1 day          Drain            NG/OG/Enteral Tube Nasogastric 16 Fr Left nares less than 1 day                Physical Exam   Constitutional: He is oriented to person, place, and time  He appears well-developed  HENT:   Head: Normocephalic  Mouth/Throat: No oropharyngeal exudate  Eyes: Pupils are equal, round, and reactive to light  No scleral icterus  Neck: Neck supple  No JVD present  Cardiovascular: Normal rate and regular rhythm  Pulmonary/Chest: Effort normal and breath sounds normal    Abdominal: He exhibits distension  There is tenderness  There is no rebound and no guarding  Musculoskeletal: Normal range of motion  He exhibits no edema  Neurological: He is alert and oriented to person, place, and time  Skin: Skin is warm  He is not diaphoretic  No erythema  Lab Results: I have personally reviewed pertinent reports  Imaging Studies: I have personally reviewed pertinent reports      EKG, Pathology, and Other Studies:   VTE Prophylaxis: Enoxaparin (Lovenox)    Code Status: Level 1 - Full Code  Advance Directive and Living Will:      Power of :    POLST:

## 2018-11-29 ENCOUNTER — APPOINTMENT (INPATIENT)
Dept: RADIOLOGY | Facility: HOSPITAL | Age: 53
DRG: 390 | End: 2018-11-29
Payer: COMMERCIAL

## 2018-11-29 PROBLEM — E66.9 CLASS 2 OBESITY IN ADULT: Status: ACTIVE | Noted: 2018-11-29

## 2018-11-29 PROBLEM — E66.812 CLASS 2 OBESITY IN ADULT: Status: ACTIVE | Noted: 2018-11-29

## 2018-11-29 LAB
ALBUMIN SERPL BCP-MCNC: 3.5 G/DL (ref 3.5–5)
ALP SERPL-CCNC: 51 U/L (ref 46–116)
ALT SERPL W P-5'-P-CCNC: 37 U/L (ref 12–78)
ANION GAP SERPL CALCULATED.3IONS-SCNC: 10 MMOL/L (ref 4–13)
AST SERPL W P-5'-P-CCNC: 28 U/L (ref 5–45)
BASOPHILS # BLD AUTO: 0.08 THOUSANDS/ΜL (ref 0–0.1)
BASOPHILS NFR BLD AUTO: 1 % (ref 0–1)
BILIRUB SERPL-MCNC: 1.1 MG/DL (ref 0.2–1)
BUN SERPL-MCNC: 24 MG/DL (ref 5–25)
CALCIUM SERPL-MCNC: 8.5 MG/DL (ref 8.3–10.1)
CHLORIDE SERPL-SCNC: 106 MMOL/L (ref 100–108)
CO2 SERPL-SCNC: 24 MMOL/L (ref 21–32)
CREAT SERPL-MCNC: 1.04 MG/DL (ref 0.6–1.3)
EOSINOPHIL # BLD AUTO: 0.26 THOUSAND/ΜL (ref 0–0.61)
EOSINOPHIL NFR BLD AUTO: 2 % (ref 0–6)
ERYTHROCYTE [DISTWIDTH] IN BLOOD BY AUTOMATED COUNT: 13.6 % (ref 11.6–15.1)
GFR SERPL CREATININE-BSD FRML MDRD: 81 ML/MIN/1.73SQ M
GLUCOSE SERPL-MCNC: 103 MG/DL (ref 65–140)
HCT VFR BLD AUTO: 48.9 % (ref 36.5–49.3)
HGB BLD-MCNC: 16.3 G/DL (ref 12–17)
IMM GRANULOCYTES # BLD AUTO: 0.05 THOUSAND/UL (ref 0–0.2)
IMM GRANULOCYTES NFR BLD AUTO: 0 % (ref 0–2)
LYMPHOCYTES # BLD AUTO: 1.41 THOUSANDS/ΜL (ref 0.6–4.47)
LYMPHOCYTES NFR BLD AUTO: 9 % (ref 14–44)
MAGNESIUM SERPL-MCNC: 2.5 MG/DL (ref 1.6–2.6)
MCH RBC QN AUTO: 31.4 PG (ref 26.8–34.3)
MCHC RBC AUTO-ENTMCNC: 33.3 G/DL (ref 31.4–37.4)
MCV RBC AUTO: 94 FL (ref 82–98)
MONOCYTES # BLD AUTO: 1.56 THOUSAND/ΜL (ref 0.17–1.22)
MONOCYTES NFR BLD AUTO: 10 % (ref 4–12)
NEUTROPHILS # BLD AUTO: 11.78 THOUSANDS/ΜL (ref 1.85–7.62)
NEUTS SEG NFR BLD AUTO: 78 % (ref 43–75)
NRBC BLD AUTO-RTO: 0 /100 WBCS
PHOSPHATE SERPL-MCNC: 3.8 MG/DL (ref 2.7–4.5)
PLATELET # BLD AUTO: 235 THOUSANDS/UL (ref 149–390)
PMV BLD AUTO: 9.8 FL (ref 8.9–12.7)
POTASSIUM SERPL-SCNC: 4.4 MMOL/L (ref 3.5–5.3)
PROT SERPL-MCNC: 7.6 G/DL (ref 6.4–8.2)
RBC # BLD AUTO: 5.19 MILLION/UL (ref 3.88–5.62)
SODIUM SERPL-SCNC: 140 MMOL/L (ref 136–145)
WBC # BLD AUTO: 15.14 THOUSAND/UL (ref 4.31–10.16)

## 2018-11-29 PROCEDURE — 83735 ASSAY OF MAGNESIUM: CPT | Performed by: FAMILY MEDICINE

## 2018-11-29 PROCEDURE — 80053 COMPREHEN METABOLIC PANEL: CPT | Performed by: FAMILY MEDICINE

## 2018-11-29 PROCEDURE — C9113 INJ PANTOPRAZOLE SODIUM, VIA: HCPCS | Performed by: SURGERY

## 2018-11-29 PROCEDURE — 84100 ASSAY OF PHOSPHORUS: CPT | Performed by: FAMILY MEDICINE

## 2018-11-29 PROCEDURE — 74022 RADEX COMPL AQT ABD SERIES: CPT

## 2018-11-29 PROCEDURE — 99232 SBSQ HOSP IP/OBS MODERATE 35: CPT | Performed by: PHYSICIAN ASSISTANT

## 2018-11-29 PROCEDURE — 85025 COMPLETE CBC W/AUTO DIFF WBC: CPT | Performed by: SURGERY

## 2018-11-29 RX ORDER — DEXTROSE, SODIUM CHLORIDE, AND POTASSIUM CHLORIDE 5; .45; .15 G/100ML; G/100ML; G/100ML
100 INJECTION INTRAVENOUS CONTINUOUS
Status: DISCONTINUED | OUTPATIENT
Start: 2018-11-29 | End: 2018-11-30 | Stop reason: HOSPADM

## 2018-11-29 RX ADMIN — HYDROMORPHONE HYDROCHLORIDE 1 MG: 1 INJECTION, SOLUTION INTRAMUSCULAR; INTRAVENOUS; SUBCUTANEOUS at 13:38

## 2018-11-29 RX ADMIN — HYDROMORPHONE HYDROCHLORIDE 1 MG: 1 INJECTION, SOLUTION INTRAMUSCULAR; INTRAVENOUS; SUBCUTANEOUS at 16:41

## 2018-11-29 RX ADMIN — LORAZEPAM 1 MG: 2 INJECTION INTRAMUSCULAR; INTRAVENOUS at 10:12

## 2018-11-29 RX ADMIN — LORAZEPAM 1 MG: 2 INJECTION INTRAMUSCULAR; INTRAVENOUS at 22:50

## 2018-11-29 RX ADMIN — Medication 2 SPRAY: at 19:50

## 2018-11-29 RX ADMIN — LORAZEPAM 1 MG: 2 INJECTION INTRAMUSCULAR; INTRAVENOUS at 01:28

## 2018-11-29 RX ADMIN — HYDROMORPHONE HYDROCHLORIDE 1 MG: 1 INJECTION, SOLUTION INTRAMUSCULAR; INTRAVENOUS; SUBCUTANEOUS at 04:36

## 2018-11-29 RX ADMIN — DEXTROSE, SODIUM CHLORIDE, AND POTASSIUM CHLORIDE 100 ML/HR: 5; .45; .15 INJECTION INTRAVENOUS at 14:17

## 2018-11-29 RX ADMIN — HYDROMORPHONE HYDROCHLORIDE 1 MG: 1 INJECTION, SOLUTION INTRAMUSCULAR; INTRAVENOUS; SUBCUTANEOUS at 21:25

## 2018-11-29 RX ADMIN — PANTOPRAZOLE SODIUM 40 MG: 40 INJECTION, POWDER, FOR SOLUTION INTRAVENOUS at 09:14

## 2018-11-29 RX ADMIN — ENOXAPARIN SODIUM 40 MG: 40 INJECTION SUBCUTANEOUS at 09:11

## 2018-11-29 RX ADMIN — HYDROMORPHONE HYDROCHLORIDE 1 MG: 1 INJECTION, SOLUTION INTRAMUSCULAR; INTRAVENOUS; SUBCUTANEOUS at 08:30

## 2018-11-29 RX ADMIN — Medication 2 SPRAY: at 21:30

## 2018-11-29 RX ADMIN — Medication 2 SPRAY: at 13:40

## 2018-11-29 NOTE — PLAN OF CARE

## 2018-11-29 NOTE — PROGRESS NOTES
Progress Note - Asa Pa 1965, 46 y o  male MRN: [id number]    Unit/Bed#: -01 Encounter: [id number]    Primary Care Provider: Samia Foster MD   Date and time admitted to hospital: 11/28/2018 12:48 AM  * SBO (small bowel obstruction) ([facility] [address] )   Assessment & Plan    Management per primary surgical team   NPO , IVF , Pain Control, Anti-emetics   Clinically improved overnight, patient is hopeful for clamping trial today and removal of NG tube as soon as possible     Essential hypertension   Assessment & Plan    Patient experiencing episodes of accelerated hypertension secondary to pain  PRN Hydralazine for SBP > 180   Resume norvasc when allowed to take PO  Patient reports blood pressure is usually controlled at home and he is compliant with follow-up     Class 2 obesity in adult   Assessment & Plan    Weight loss     RLS (restless legs syndrome)   Assessment & Plan    Hold requip until patient able to take PO        VTE Pharmacologic Prophylaxis:   Pharmacologic: Enoxaparin (Lovenox)  Mechanical VTE Prophylaxis in Place: No    Patient Centered Rounds: I have performed bedside rounds with nursing staff today  Discussions with Specialists or Other Care Team Provider: surgery    Education and Discussions with Family / Patient:     Time Spent for Care: 20 minutes  More than 50% of total time spent on counseling and coordination of care as described above  Current Length of Stay: 1 day(s)    Current Patient Status: Inpatient   Certification Statement: The patient will continue to require additional inpatient hospital stay due to per surgery    Discharge Plan: per surgery, medically stable from our standpoint    Code Status: Level 1 - Full Code    Subjective:   Patient states he is doing much better today like night and day " He states he has been passing gas and had a bowel movement and was told that we could clamp the NG tube    He wants to get it out as soon as possible and says he can't stand it any more     Objective:     Vitals:   Temp (24hrs), Av °F (36 7 °C), Min:97 8 °F (36 6 °C), Max:98 4 °F (36 9 °C)    Temp:  [97 8 °F (36 6 °C)-98 4 °F (36 9 °C)] 97 9 °F (36 6 °C)  HR:  [] 83  Resp:  [18] 18  BP: (123-139)/(66-89) 128/76  SpO2:  [93 %-94 %] 93 %  Body mass index is 37 3 kg/m²  Input and Output Summary (last 24 hours): Intake/Output Summary (Last 24 hours) at 18 1011  Last data filed at 18 0427   Gross per 24 hour   Intake                0 ml   Output             1400 ml   Net            -1400 ml       Physical Exam:     Physical Exam   Constitutional: He appears well-developed and well-nourished  No distress  HENT:   Head: Normocephalic and atraumatic  NG tube in place   Eyes: Conjunctivae are normal  Right eye exhibits no discharge  Left eye exhibits no discharge  No scleral icterus  Cardiovascular: Normal rate  No murmur heard  Pulmonary/Chest: Effort normal and breath sounds normal  No respiratory distress  He has no wheezes  No dyspnea or respiratory distress  Cough noted on 1 occasion   Abdominal: Soft  Bowel sounds are normal  He exhibits no distension  Soft obese abdomen, nontender  Bowel sounds noted   Musculoskeletal: He exhibits no edema  Neurological: He is alert  Awake alert and interactive he is a good historian   Skin: Skin is warm and dry  No rash noted  He is not diaphoretic  No erythema  No pallor  Psychiatric: He has a normal mood and affect  His behavior is normal    Vitals reviewed        Additional Data:     Labs:      Results from last 7 days  Lab Units 18  0448   WBC Thousand/uL 15 14*   HEMOGLOBIN g/dL 16 3   HEMATOCRIT % 48 9   PLATELETS Thousands/uL 235   NEUTROS PCT % 78*   LYMPHS PCT % 9*   MONOS PCT % 10   EOS PCT % 2       Results from last 7 days  Lab Units 18  0448   SODIUM mmol/L 140   POTASSIUM mmol/L 4 4   CHLORIDE mmol/L 106   CO2 mmol/L 24   BUN mg/dL 24   CREATININE mg/dL 1 04   ANION GAP mmol/L 10 CALCIUM mg/dL 8 5   ALBUMIN g/dL 3 5   TOTAL BILIRUBIN mg/dL 1 10*   ALK PHOS U/L 51   ALT U/L 37   AST U/L 28   GLUCOSE RANDOM mg/dL 103       Results from last 7 days  Lab Units 11/28/18  0110   INR  1 03               Results from last 7 days  Lab Units 11/28/18  0110   LACTIC ACID mmol/L 1 7       * I Have Reviewed All Lab Data Listed Above  * Additional Pertinent Lab Tests Reviewed: All Labs Within Last 24 Hours Reviewed    Imaging:    Imaging Reports Reviewed Today Include:   Imaging Personally Reviewed by Myself Includes:      Recent Cultures (last 7 days):           Last 24 Hours Medication List:     Current Facility-Administered Medications:  acetaminophen 650 mg Oral Q6H PRN Maddy Juarez    diazepam 2 5 mg Intravenous Q6H PRN Maddy Juarez    diphenhydrAMINE 25 mg Intravenous Q6H PRN Maddy Juarez    enoxaparin 40 mg Subcutaneous Daily Maddy Juarez    hydrALAZINE 10 mg Intravenous Q6H PRN Aida Lizama MD    HYDROmorphone 1 mg Intravenous Q3H PRN Shady Serrano DO    LORazepam 1 mg Intravenous Q4H PRN Shady Serrano DO    metoprolol 5 mg Intravenous Q8H PRN Maddy Juarez    nicotine 1 patch Transdermal Daily Maddy Juarez    ondansetron 4 mg Intravenous Q4H PRN Maddy Juarez    pantoprazole 40 mg Intravenous Q24H [address] Shady Serrano,     phenol 2 spray Mouth/Throat Q2H PRN Maddy Juarez    sodium chloride 125 mL/hr Intravenous Continuous Maddy Juarez Last Rate: 125 mL/hr (11/28/18 0753)        Today, Patient Was Seen By: Claudio Mendieta PA-C    ** Please Note: Dictation voice to text software may have been used in the creation of this document   **

## 2018-11-29 NOTE — ASSESSMENT & PLAN NOTE
NPO/NGT-likely clamp trial  IVF  F/u OBS this am  Prn pain control  IS/OOB/ambulation  F/u SLIM  DVT ppx: lovenox

## 2018-11-29 NOTE — PROGRESS NOTES
Progress Note - Tamara Silveira 1965, 46 y o  male MRN: [id number]    Unit/Bed#: -01 Encounter: [id number]    Primary Care Provider: Eren Ortega MD   Date and time admitted to hospital: 11/28/2018 12:48 AM        * SBO (small bowel obstruction) ([facility] [address] )   Assessment & Plan    NPO/NGT-likely clamp trial  IVF  F/u OBS this am  Prn pain control  IS/OOB/ambulation  F/u SLIM  DVT ppx: lovenox                 Subjective/Objective   Chief Complaint:     Subjective: JOHNNY  +BM, +F  No N/V  Abdominal pain largely resolved  Objective:     Blood pressure 123/66, pulse 104, temperature 98 4 °F (36 9 °C), temperature source Oral, resp  rate 18, height 6' (1 829 m), weight 125 kg (275 lb), SpO2 93 %  ,Body mass index is 37 3 kg/m²  Intake/Output Summary (Last 24 hours) at 11/29/18 0706  Last data filed at 11/29/18 0427   Gross per 24 hour   Intake                0 ml   Output             1700 ml   Net            -1700 ml       Invasive Devices     Peripheral Intravenous Line            Peripheral IV 11/28/18 Right Antecubital 1 day          Drain            NG/OG/Enteral Tube Nasogastric 16 Fr Left nares 1 day                Physical Exam: NAD  AAOX3  Normal respiratory effort  Soft, TTP LLQ, ND  No c/c/e      Lab, Imaging and other studies:  I have personally reviewed pertinent lab results    , CBC:   Lab Results   Component Value Date    WBC 15 14 (H) 11/29/2018    HGB 16 3 11/29/2018    HCT 48 9 11/29/2018    MCV 94 11/29/2018     11/29/2018    MCH 31 4 11/29/2018    MCHC 33 3 11/29/2018    RDW 13 6 11/29/2018    MPV 9 8 11/29/2018    NRBC 0 11/29/2018     VTE Pharmacologic Prophylaxis: Enoxaparin (Lovenox)  VTE Mechanical Prophylaxis: sequential compression device

## 2018-11-30 VITALS
BODY MASS INDEX: 37.36 KG/M2 | SYSTOLIC BLOOD PRESSURE: 149 MMHG | TEMPERATURE: 97.6 F | OXYGEN SATURATION: 97 % | DIASTOLIC BLOOD PRESSURE: 84 MMHG | HEART RATE: 73 BPM | HEIGHT: 72 IN | RESPIRATION RATE: 20 BRPM | WEIGHT: 275.8 LBS

## 2018-11-30 LAB
ANION GAP SERPL CALCULATED.3IONS-SCNC: 8 MMOL/L (ref 4–13)
BASOPHILS # BLD AUTO: 0.05 THOUSANDS/ΜL (ref 0–0.1)
BASOPHILS NFR BLD AUTO: 1 % (ref 0–1)
BUN SERPL-MCNC: 13 MG/DL (ref 5–25)
CALCIUM SERPL-MCNC: 8.5 MG/DL (ref 8.3–10.1)
CHLORIDE SERPL-SCNC: 106 MMOL/L (ref 100–108)
CO2 SERPL-SCNC: 26 MMOL/L (ref 21–32)
CREAT SERPL-MCNC: 0.87 MG/DL (ref 0.6–1.3)
EOSINOPHIL # BLD AUTO: 0.46 THOUSAND/ΜL (ref 0–0.61)
EOSINOPHIL NFR BLD AUTO: 5 % (ref 0–6)
ERYTHROCYTE [DISTWIDTH] IN BLOOD BY AUTOMATED COUNT: 13.2 % (ref 11.6–15.1)
GFR SERPL CREATININE-BSD FRML MDRD: 98 ML/MIN/1.73SQ M
GLUCOSE SERPL-MCNC: 91 MG/DL (ref 65–140)
HCT VFR BLD AUTO: 44.7 % (ref 36.5–49.3)
HGB BLD-MCNC: 15 G/DL (ref 12–17)
IMM GRANULOCYTES # BLD AUTO: 0.02 THOUSAND/UL (ref 0–0.2)
IMM GRANULOCYTES NFR BLD AUTO: 0 % (ref 0–2)
LYMPHOCYTES # BLD AUTO: 1.7 THOUSANDS/ΜL (ref 0.6–4.47)
LYMPHOCYTES NFR BLD AUTO: 20 % (ref 14–44)
MCH RBC QN AUTO: 31.4 PG (ref 26.8–34.3)
MCHC RBC AUTO-ENTMCNC: 33.6 G/DL (ref 31.4–37.4)
MCV RBC AUTO: 94 FL (ref 82–98)
MONOCYTES # BLD AUTO: 0.75 THOUSAND/ΜL (ref 0.17–1.22)
MONOCYTES NFR BLD AUTO: 9 % (ref 4–12)
NEUTROPHILS # BLD AUTO: 5.53 THOUSANDS/ΜL (ref 1.85–7.62)
NEUTS SEG NFR BLD AUTO: 65 % (ref 43–75)
NRBC BLD AUTO-RTO: 0 /100 WBCS
PLATELET # BLD AUTO: 195 THOUSANDS/UL (ref 149–390)
PMV BLD AUTO: 9.5 FL (ref 8.9–12.7)
POTASSIUM SERPL-SCNC: 3.8 MMOL/L (ref 3.5–5.3)
RBC # BLD AUTO: 4.78 MILLION/UL (ref 3.88–5.62)
SODIUM SERPL-SCNC: 140 MMOL/L (ref 136–145)
WBC # BLD AUTO: 8.51 THOUSAND/UL (ref 4.31–10.16)

## 2018-11-30 PROCEDURE — 99232 SBSQ HOSP IP/OBS MODERATE 35: CPT | Performed by: PHYSICIAN ASSISTANT

## 2018-11-30 PROCEDURE — 85025 COMPLETE CBC W/AUTO DIFF WBC: CPT | Performed by: STUDENT IN AN ORGANIZED HEALTH CARE EDUCATION/TRAINING PROGRAM

## 2018-11-30 PROCEDURE — C9113 INJ PANTOPRAZOLE SODIUM, VIA: HCPCS | Performed by: SURGERY

## 2018-11-30 PROCEDURE — 80048 BASIC METABOLIC PNL TOTAL CA: CPT | Performed by: STUDENT IN AN ORGANIZED HEALTH CARE EDUCATION/TRAINING PROGRAM

## 2018-11-30 RX ORDER — OXYCODONE HYDROCHLORIDE AND ACETAMINOPHEN 5; 325 MG/1; MG/1
2 TABLET ORAL EVERY 4 HOURS PRN
Status: DISCONTINUED | OUTPATIENT
Start: 2018-11-30 | End: 2018-11-30 | Stop reason: HOSPADM

## 2018-11-30 RX ORDER — OXYCODONE HYDROCHLORIDE AND ACETAMINOPHEN 5; 325 MG/1; MG/1
1 TABLET ORAL EVERY 4 HOURS PRN
Status: DISCONTINUED | OUTPATIENT
Start: 2018-11-30 | End: 2018-11-30 | Stop reason: HOSPADM

## 2018-11-30 RX ADMIN — OXYCODONE HYDROCHLORIDE AND ACETAMINOPHEN 1 TABLET: 5; 325 TABLET ORAL at 12:49

## 2018-11-30 RX ADMIN — ENOXAPARIN SODIUM 40 MG: 40 INJECTION SUBCUTANEOUS at 08:28

## 2018-11-30 RX ADMIN — PANTOPRAZOLE SODIUM 40 MG: 40 INJECTION, POWDER, FOR SOLUTION INTRAVENOUS at 08:28

## 2018-11-30 RX ADMIN — LORAZEPAM 1 MG: 2 INJECTION INTRAMUSCULAR; INTRAVENOUS at 05:53

## 2018-11-30 RX ADMIN — OXYCODONE HYDROCHLORIDE AND ACETAMINOPHEN 1 TABLET: 5; 325 TABLET ORAL at 08:28

## 2018-11-30 NOTE — DISCHARGE INSTRUCTIONS
Please do not eat steak, pork, or salads for at least the next two weeks  Please try to stick to a soft diet like pasta, rice, applesauce, ice cream, soups, etc  for at least the next two weeks  Please continue to drink plenty of fluids as well  Bowel Obstruction   WHAT YOU NEED TO KNOW:   What is a bowel obstruction? A bowel obstruction occurs when your large or small intestine is completely or partly blocked  The blockage prevents food and waste from passing through normally  What causes a bowel obstruction? · Adhesions  are bands of scar tissue that may form after a surgery  An adhesion attaches your intestine to a nearby organ or to the wall of your abdomen  This may pull your intestine out of place and cause an obstruction  · A hernia  occurs when part of the intestine bulges through the muscle wall of the abdomen  The hernia may cause an obstruction if the intestine becomes trapped  · A tumor  may cause a blockage of the intestine  · A foreign body  can block the intestine  A foreign body is something other than food that is swallowed  · A sliding or folding of part of the intestine  into another portion of the intestine may cause a bowel obstruction  · Medical conditions  such as Crohn disease and diverticulitis cause changes to the intestine that may cause a bowel obstruction  · A twisting of the intestine  may cause a bowel obstruction  What are the signs and symptoms of a bowel obstruction? · Nausea and vomiting    · Abdominal pain    · Enlarged abdomen    · Decreased or no bowel movements or gas  How is a bowel obstruction diagnosed? · Blood tests  may show if you have an infection, or if you are dehydrated  Dehydration can develop when your intestines cannot absorb liquid properly  · An x-ray  takes pictures of the organs inside your abdomen  The pictures are used to look for an obstruction      · A CT or MRI scan  may be used to take pictures of your intestines  The pictures may show the location and cause of your blockage  You may be given a dye before the pictures are taken to help healthcare providers see the blockage better  Tell the healthcare provider if you have ever had an allergic reaction to contrast dye  Do not enter the MRI room with anything metal  Metal can cause serious injury  Tell the healthcare provider if you have any metal in or on your body  · An ultrasound  uses sound waves to show pictures of your intestines on a monitor  An ultrasound may be done to find the location of your obstruction  How is a bowel obstruction treated? · An IV  may be used to give you liquids and nutrition  You may not be able to eat or drink anything until your healthcare provider says it is okay  · A nasogastric tube  may be put into your nose  The tube passes through your throat and is guided into your stomach  The tube will be attached to a suction device that removes air and fluid from your stomach  · Antibiotics  may be given to help treat or prevent an infection caused by bacteria  · Surgery  may be done to treat the cause of the blockage  When should I contact my healthcare provider? · You have nausea and are vomiting  · Your abdomen is enlarged  · You cannot pass a bowel movement or gas  · You lose weight without trying  · You have blood in your bowel movement  · You have questions or concerns about your condition or care  When should I seek immediate care or call 911? · You have severe abdominal pain that does not get better  · Your heart is beating faster than normal for you  · You have a fever  CARE AGREEMENT:   You have the right to help plan your care  Learn about your health condition and how it may be treated  Discuss treatment options with your caregivers to decide what care you want to receive  You always have the right to refuse treatment  The above information is an  only   It is not intended as medical advice for individual conditions or treatments  Talk to your doctor, nurse or pharmacist before following any medical regimen to see if it is safe and effective for you  © 2017 [employer] Information is for End User's use only and may not be sold, redistributed or otherwise used for commercial purposes  All illustrations and images included in CareNotes® are the copyrighted property of [employer]  or [employer]ina [employer]

## 2018-11-30 NOTE — PROGRESS NOTES
Progress Note -Surgery PA  Susi Mcclendon 46 y o  male MRN: [id number]  Unit/Bed#: -01 Encounter: [id number]      Subjective/Objective     Subjective: Pt stating he feels better  Tolerated clears  Still has some soreness to LLQ  Denies N/V  Did have one large BM       Objective:     /84 (BP Location: Left arm)   Pulse 73   Temp 98 5 °F (36 9 °C) (Oral)   Resp 18   Ht 6' (1 829 m)   Wt 125 kg (275 lb 12 8 oz)   SpO2 95%   BMI 37 41 kg/m²       Intake/Output Summary (Last 24 hours) at 11/30/18 0818  Last data filed at 11/29/18 2253   Gross per 24 hour   Intake               10 ml   Output               70 ml   Net              -60 ml       Invasive Devices     Peripheral Intravenous Line            Peripheral IV 11/28/18 Right Antecubital 2 days                Physical Exam:  General appearance: alert and oriented, in no acute distress  Lungs: clear to auscultation bilaterally  Heart: regular rate and rhythm, S1, S2 normal, no murmur, click, rub or gallop  Abdomen: soft, bowel sounds +, some discomfort to palpation LLQ      Current Facility-Administered Medications:     acetaminophen (TYLENOL) tablet 650 mg, 650 mg, Oral, Q6H PRN, Maddy Juarez    dextrose 5 % and sodium chloride 0 45 % with KCl 20 mEq/L infusion, 100 mL/hr, Intravenous, Continuous, Amanda Chang MD, Last Rate: 100 mL/hr at 11/29/18 1417, 100 mL/hr at 11/29/18 1417    diazepam (VALIUM) injection 2 5 mg, 2 5 mg, Intravenous, Q6H PRN, Maddy Juarez    diphenhydrAMINE (BENADRYL) injection 25 mg, 25 mg, Intravenous, Q6H PRN, Maddy Juarez    enoxaparin (LOVENOX) subcutaneous injection 40 mg, 40 mg, Subcutaneous, Daily, Maddy Juarez, 40 mg at 11/29/18 0911    hydrALAZINE (APRESOLINE) injection 10 mg, 10 mg, Intravenous, Q6H PRN, Elyn Goodpasture, MD    LORazepam (ATIVAN) 2 mg/mL injection 1 mg, 1 mg, Intravenous, Q4H PRN, Shady Serrano DO, 1 mg at 11/30/18 0553    metoprolol (LOPRESSOR) injection 5 mg, 5 mg, Intravenous, Q8H PRN, [place] Larry, 5 mg at 11/28/18 0751    ondansetron (ZOFRAN) injection 4 mg, 4 mg, Intravenous, Q4H PRN, Bowman Pole    oxyCODONE-acetaminophen (PERCOCET) 5-325 mg per tablet 1 tablet, 1 tablet, Oral, Q4H PRN, Jessica Hernández PA-C    oxyCODONE-acetaminophen (PERCOCET) 5-325 mg per tablet 2 tablet, 2 tablet, Oral, Q4H PRN, Jessica Hernández PA-C    pantoprazole (PROTONIX) injection 40 mg, 40 mg, Intravenous, Q24H [facility], Shady Matteomikel, DO, 40 mg at 11/29/18 0914    phenol (CHLORASEPTIC) 1 4 % mucosal liquid 2 spray, 2 spray, Mouth/Throat, Q2H PRN, Bowman Pole, 2 spray at 11/29/18 2130              Lab, Imaging and other studies:  I have personally reviewed pertinent lab results  , CBC:   Lab Results   Component Value Date    WBC 8 51 11/30/2018    HGB 15 0 11/30/2018    HCT 44 7 11/30/2018    MCV 94 11/30/2018     11/30/2018    MCH 31 4 11/30/2018    MCHC 33 6 11/30/2018    RDW 13 2 11/30/2018    MPV 9 5 11/30/2018    NRBC 0 11/30/2018   , CMP:   Lab Results   Component Value Date    SODIUM 140 11/30/2018    K 3 8 11/30/2018     11/30/2018    CO2 26 11/30/2018    BUN 13 11/30/2018    CREATININE 0 87 11/30/2018    CALCIUM 8 5 11/30/2018    EGFR 98 11/30/2018     Labs in chart were reviewed    Lab Results   Component Value Date    WBC 8 51 11/30/2018    HGB 15 0 11/30/2018    HCT 44 7 11/30/2018     11/30/2018     Lab Results   Component Value Date    K 3 8 11/30/2018     11/30/2018    CO2 26 11/30/2018    BUN 13 11/30/2018    CREATININE 0 87 11/30/2018       VTE Pharmacologic Prophylaxis: Enoxaparin (Lovenox)  VTE Mechanical Prophylaxis: sequential compression device    Assessment:    46year old male presenting with SBO, resolving    Plan:    - will advance to fls/toast/crx  - pain control  - encourage oob/ambulation  - DVT ppx    Patient Active Problem List   Diagnosis    SBO (small bowel obstruction) (HCC)    Essential hypertension    RLS (restless legs syndrome)    Class 2 obesity in adult          This text is generated with voice recognition software  There may be translation, syntax,  or grammatical errors  If you have any questions, please contact the dictating provider      Miranda Rodarte PA-C

## 2018-11-30 NOTE — ASSESSMENT & PLAN NOTE
Patient experiencing episodes of accelerated hypertension secondary to pain  Resume norvasc  Patient reports blood pressure is usually controlled at home and he is compliant with follow-up

## 2018-11-30 NOTE — PROGRESS NOTES
Attempted NGT clamp trial  Clamped NGT at 1520pm and then reattached to suction for over an hour  Only output was 75mL of yellow/orange that was in suction tubing  NGT then removed and clear liquid diet ordered for patient  Per surgical note/nursing communication order from E S  Will continue to monitor

## 2018-11-30 NOTE — DISCHARGE SUMMARY
Discharge Summary - Bernadine Jean-Baptiste 46 y o  male MRN: [id number]    Unit/Bed#: -01 Encounter: [id number]    Admission Date: 11/28/2018   Discharge Date: 11/30/18    Admitting Diagnosis:   Small bowel obstruction ([facility] [address] ) [K56 609]  Abdominal pain [R10 9]    Discharge Diagnoses: Principal Problem:    SBO (small bowel obstruction) ([facility] [address] )  Active Problems:    Essential hypertension    RLS (restless legs syndrome)    Class 2 obesity in adult      Consultations: SLIM    Procedures Performed:  none    Hospital Course: Bernadine Jean-Baptiste is a 46 y o  male with PMHx of HTN and multiple colon resections for diverticulitis admitted for severe abdominal pain  CTAP ordered in the ED showed small bowel obstruction  Patient was admitted and placed NPO and an NG tube was placed  SLIM was consulted for patient's systolic blood pressures >419 which were controlled with hydralazine and metoprolol  On 11/29 patient's pain improved and only had one recorded episode of increased blood pressure  On 11/30 a clamp trial of patient's NG tube was successful, patient tolerated toast and crackers, had multiple bowel movements, and blood pressure remained under control  Condition at Discharge: good     Discharge instructions/Information to patient and family:   See after visit summary for information provided to patient and family  Provisions for Follow-Up Care:  See after visit summary for information related to follow-up care and any pertinent home health orders  Disposition: Home    Planned Readmission: No    Discharge Statement   I spent 35 minutes discharging the patient  This time was spent on the day of discharge  I had direct contact with the patient on the day of discharge  Additional documentation is required if more than 30 minutes were spent on discharge  Discharge Medications:  See after visit summary for reconciled discharge medications provided to patient and family

## 2018-12-03 ENCOUNTER — TRANSITIONAL CARE MANAGEMENT (OUTPATIENT)
Dept: FAMILY MEDICINE CLINIC | Facility: CLINIC | Age: 53
End: 2018-12-03

## 2019-06-06 ENCOUNTER — HOSPITAL ENCOUNTER (EMERGENCY)
Facility: HOSPITAL | Age: 54
Discharge: HOME | End: 2019-06-06
Attending: EMERGENCY MEDICINE
Payer: OTHER MISCELLANEOUS

## 2019-06-06 VITALS
RESPIRATION RATE: 20 BRPM | HEART RATE: 70 BPM | HEIGHT: 72 IN | BODY MASS INDEX: 39.45 KG/M2 | DIASTOLIC BLOOD PRESSURE: 99 MMHG | SYSTOLIC BLOOD PRESSURE: 155 MMHG | TEMPERATURE: 97.9 F | OXYGEN SATURATION: 97 % | WEIGHT: 291.23 LBS

## 2019-06-06 DIAGNOSIS — S05.01XA ABRASION OF RIGHT CORNEA, INITIAL ENCOUNTER: Primary | ICD-10-CM

## 2019-06-06 PROCEDURE — 99283 EMERGENCY DEPT VISIT LOW MDM: CPT | Performed by: PHYSICIAN ASSISTANT

## 2019-06-06 PROCEDURE — 99283 EMERGENCY DEPT VISIT LOW MDM: CPT

## 2019-06-06 RX ORDER — AMLODIPINE BESYLATE 10 MG/1
1 TABLET ORAL DAILY
COMMUNITY
Start: 2012-11-10

## 2019-06-06 RX ORDER — HYDROCHLOROTHIAZIDE 25 MG/1
1 TABLET ORAL DAILY
COMMUNITY
Start: 2012-11-10

## 2019-06-06 RX ORDER — ROPINIROLE 1 MG/1
TABLET, FILM COATED ORAL
COMMUNITY
Start: 2018-09-18

## 2019-06-06 RX ORDER — TETRACAINE HYDROCHLORIDE 5 MG/ML
2 SOLUTION OPHTHALMIC ONCE
Status: COMPLETED | OUTPATIENT
Start: 2019-06-06 | End: 2019-06-06

## 2019-06-06 RX ORDER — GABAPENTIN 300 MG/1
600 CAPSULE ORAL
COMMUNITY
Start: 2018-09-25

## 2019-06-06 RX ORDER — ERYTHROMYCIN 5 MG/G
0.5 OINTMENT OPHTHALMIC 4 TIMES DAILY
Qty: 20 G | Refills: 0 | Status: SHIPPED | OUTPATIENT
Start: 2019-06-06 | End: 2019-06-11

## 2019-06-06 RX ORDER — LISINOPRIL 20 MG/1
TABLET ORAL
COMMUNITY
Start: 2018-09-25

## 2019-06-06 RX ADMIN — TETRACAINE HYDROCHLORIDE 2 DROP: 5 SOLUTION OPHTHALMIC at 14:49

## 2019-06-06 RX ADMIN — FLUORESCEIN SODIUM 1 STRIP: 0.6 STRIP OPHTHALMIC at 14:48

## 2019-10-14 NOTE — PROGRESS NOTES
Progress Note - Carrie Drake 1965, 46 y o  male MRN: [id number]    Unit/Bed#: -01 Encounter: [id number]    Primary Care Provider: Marina Lopez MD   Date and time admitted to hospital: 2018 12:48 AM    * SBO (small bowel obstruction) ([facility] [address] )   Assessment & Plan    Management per primary surgical team   NGT out, feels better-Diet advanced       Essential hypertension   Assessment & Plan    Patient experiencing episodes of accelerated hypertension secondary to pain  Resume norvasc  Patient reports blood pressure is usually controlled at home and he is compliant with follow-up     Class 2 obesity in adult   Assessment & Plan    Weight loss       VTE Pharmacologic Prophylaxis:   Pharmacologic: Enoxaparin (Lovenox)  Mechanical VTE Prophylaxis in Place: No    Patient Centered Rounds: spoke with rn    Discussions with Specialists or Other Care Team Provider:     Education and Discussions with Family / Patient:     Time Spent for Care: 20 minutes  More than 50% of total time spent on counseling and coordination of care as described above  Current Length of Stay: 2 day(s)    Current Patient Status: Inpatient   Certification Statement: per surgery team    Discharge Plan: stable for d/c per our service    Code Status: Level 1 - Full Code      Subjective:   Patient feels good and is waiting for his discharge papers  Denies CP, SOB, headache or blurred vision  Objective:     Vitals:   Temp (24hrs), Av 9 °F (36 6 °C), Min:97 6 °F (36 4 °C), Max:98 5 °F (36 9 °C)    Temp:  [97 6 °F (36 4 °C)-98 5 °F (36 9 °C)] 97 6 °F (36 4 °C)  HR:  [73-81] 73  Resp:  [18-20] 20  BP: (140-175)/(82-84) 149/84  SpO2:  [93 %-97 %] 97 %  Body mass index is 37 41 kg/m²  Input and Output Summary (last 24 hours):        Intake/Output Summary (Last 24 hours) at 18 1359  Last data filed at 18 5407   Gross per 24 hour   Intake               10 ml   Output               70 ml   Net              -60 ml Pt states she has a deep barking cough. Her chest hurts when she takes a breath. Denies any fever. Will appoint today.   Physical Exam:     Physical Exam   Constitutional: He appears well-developed and well-nourished  No distress  HENT:   Head: Normocephalic and atraumatic  Eyes: Conjunctivae are normal  Right eye exhibits no discharge  Left eye exhibits no discharge  No scleral icterus  Cardiovascular: Normal rate, regular rhythm and normal heart sounds  No murmur heard  Pulmonary/Chest: Effort normal and breath sounds normal  No respiratory distress  He has no wheezes  He has no rales  Abdominal: Soft  Bowel sounds are normal  He exhibits no distension  There is no tenderness  Musculoskeletal: He exhibits no edema  Neurological: He is alert  Awake alert and interactive   Skin: Skin is warm and dry  No rash noted  He is not diaphoretic  No erythema  No pallor  Psychiatric: He has a normal mood and affect  Vitals reviewed  Additional Data:     Labs:      Results from last 7 days  Lab Units 11/30/18  0505   WBC Thousand/uL 8 51   HEMOGLOBIN g/dL 15 0   HEMATOCRIT % 44 7   PLATELETS Thousands/uL 195   NEUTROS PCT % 65   LYMPHS PCT % 20   MONOS PCT % 9   EOS PCT % 5       Results from last 7 days  Lab Units 11/30/18  0505 11/29/18  0448   SODIUM mmol/L 140 140   POTASSIUM mmol/L 3 8 4 4   CHLORIDE mmol/L 106 106   CO2 mmol/L 26 24   BUN mg/dL 13 24   CREATININE mg/dL 0 87 1 04   ANION GAP mmol/L 8 10   CALCIUM mg/dL 8 5 8 5   ALBUMIN g/dL  --  3 5   TOTAL BILIRUBIN mg/dL  --  1 10*   ALK PHOS U/L  --  51   ALT U/L  --  37   AST U/L  --  28   GLUCOSE RANDOM mg/dL 91 103       Results from last 7 days  Lab Units 11/28/18  0110   INR  1 03               Results from last 7 days  Lab Units 11/28/18  0110   LACTIC ACID mmol/L 1 7       * I Have Reviewed All Lab Data Listed Above  * Additional Pertinent Lab Tests Reviewed:  [address] Admission Reviewed    Imaging:    Imaging Reports Reviewed Today Include:   Imaging Personally Reviewed by Myself Includes:      Recent Cultures (last 7 days): Last 24 Hours Medication List:     Current Facility-Administered Medications:  acetaminophen 650 mg Oral Q6H PRN Maddy Juarez    dextrose 5 % and sodium chloride 0 45 % with KCl 20 mEq/L 100 mL/hr Intravenous Continuous Mitesh Rm MD Last Rate: 100 mL/hr (11/29/18 1417)   diazepam 2 5 mg Intravenous Q6H PRN Maddy Juarez    diphenhydrAMINE 25 mg Intravenous Q6H PRN Maddy Juarez    enoxaparin 40 mg Subcutaneous Daily Maddy Juarez    hydrALAZINE 10 mg Intravenous Q6H PRN Aida Lizama MD    LORazepam 1 mg Intravenous Q4H PRN Shady Serrano DO    metoprolol 5 mg Intravenous Q8H PRN Maddy Juarez    ondansetron 4 mg Intravenous Q4H PRN Maddy Juarez    oxyCODONE-acetaminophen 1 tablet Oral Q4H PRN Ld Mock PA-C    oxyCODONE-acetaminophen 2 tablet Oral Q4H PRN Ld Mock PA-C    pantoprazole 40 mg Intravenous Q24H [address] Shady Serrano DO    phenol 2 spray Mouth/Throat Q2H PRN Crescencio Orozco         Today, Patient Was Seen By: Vic Farrell PA-C    ** Please Note: Dictation voice to text software may have been used in the creation of this document   **

## 2021-05-08 ENCOUNTER — IMMUNIZATIONS (OUTPATIENT)
Dept: FAMILY MEDICINE CLINIC | Facility: HOSPITAL | Age: 56
End: 2021-05-08

## 2021-05-08 DIAGNOSIS — Z23 ENCOUNTER FOR IMMUNIZATION: Primary | ICD-10-CM

## 2021-05-08 PROCEDURE — 0001A SARS-COV-2 / COVID-19 MRNA VACCINE (PFIZER-BIONTECH) 30 MCG: CPT

## 2021-05-08 PROCEDURE — 91300 SARS-COV-2 / COVID-19 MRNA VACCINE (PFIZER-BIONTECH) 30 MCG: CPT

## 2021-05-29 ENCOUNTER — IMMUNIZATIONS (OUTPATIENT)
Dept: FAMILY MEDICINE CLINIC | Facility: HOSPITAL | Age: 56
End: 2021-05-29

## 2021-05-29 DIAGNOSIS — Z23 ENCOUNTER FOR IMMUNIZATION: Primary | ICD-10-CM

## 2021-05-29 PROCEDURE — 91300 SARS-COV-2 / COVID-19 MRNA VACCINE (PFIZER-BIONTECH) 30 MCG: CPT

## 2021-05-29 PROCEDURE — 0002A SARS-COV-2 / COVID-19 MRNA VACCINE (PFIZER-BIONTECH) 30 MCG: CPT

## 2021-11-22 ENCOUNTER — NEW REFERRAL (OUTPATIENT)
Dept: URBAN - METROPOLITAN AREA CLINIC 6 | Facility: CLINIC | Age: 56
End: 2021-11-22

## 2021-11-22 DIAGNOSIS — H04.123: ICD-10-CM

## 2021-11-22 DIAGNOSIS — H25.13: ICD-10-CM

## 2021-11-22 PROCEDURE — G8427 DOCREV CUR MEDS BY ELIG CLIN: HCPCS

## 2021-11-22 PROCEDURE — 1036F TOBACCO NON-USER: CPT

## 2021-11-22 PROCEDURE — 92004 COMPRE OPH EXAM NEW PT 1/>: CPT

## 2021-11-22 ASSESSMENT — TONOMETRY
OS_IOP_MMHG: 17
OD_IOP_MMHG: 16

## 2021-11-22 ASSESSMENT — VISUAL ACUITY
OD_PH: 20/80+1
OD_SC: CF 4FT
OS_SC: 20/80
OS_PH: 20/40

## 2022-01-13 ENCOUNTER — FOLLOW UP (OUTPATIENT)
Dept: URBAN - METROPOLITAN AREA CLINIC 6 | Facility: CLINIC | Age: 57
End: 2022-01-13

## 2022-01-13 DIAGNOSIS — H25.813: ICD-10-CM

## 2022-01-13 DIAGNOSIS — H04.123: ICD-10-CM

## 2022-01-13 PROCEDURE — 92136 OPHTHALMIC BIOMETRY: CPT

## 2022-01-13 PROCEDURE — 92012 INTRM OPH EXAM EST PATIENT: CPT

## 2022-01-13 ASSESSMENT — KERATOMETRY
OS_AXISANGLE_DEGREES: 2
OD_AXISANGLE2_DEGREES: 90
OD_AXISANGLE_DEGREES: 180
OS_K2POWER_DIOPTERS: 43.25
OD_K1POWER_DIOPTERS: 42.25
OD_K2POWER_DIOPTERS: 43.25
OS_K1POWER_DIOPTERS: 42.25
OS_AXISANGLE2_DEGREES: 92

## 2022-01-13 ASSESSMENT — VISUAL ACUITY
OD_PH: 20/80+1
OD_SC: CF 4FT
OS_PH: 20/50
OS_SC: 20/80
OU_SC: J16
OS_GLARE: 20/100

## 2022-01-13 ASSESSMENT — TONOMETRY
OS_IOP_MMHG: 18
OD_IOP_MMHG: 17

## 2022-02-01 ENCOUNTER — SURGERY/PROCEDURE (OUTPATIENT)
Dept: URBAN - METROPOLITAN AREA SURGICAL CENTER 6 | Facility: SURGICAL CENTER | Age: 57
End: 2022-02-01

## 2022-02-01 DIAGNOSIS — H25.811: ICD-10-CM

## 2022-02-01 PROCEDURE — 66984 XCAPSL CTRC RMVL W/O ECP: CPT

## 2022-02-02 ENCOUNTER — 1 DAY POST-OP (OUTPATIENT)
Dept: URBAN - METROPOLITAN AREA CLINIC 6 | Facility: CLINIC | Age: 57
End: 2022-02-02

## 2022-02-02 DIAGNOSIS — Z96.1: ICD-10-CM

## 2022-02-02 PROCEDURE — 99024 POSTOP FOLLOW-UP VISIT: CPT

## 2022-02-02 ASSESSMENT — TONOMETRY
OD_IOP_MMHG: 25
OS_IOP_MMHG: 17

## 2022-02-02 ASSESSMENT — KERATOMETRY
OD_AXISANGLE_DEGREES: 180
OD_AXISANGLE2_DEGREES: 90
OS_K1POWER_DIOPTERS: 42.25
OS_K1POWER_DIOPTERS: 42.25
OS_AXISANGLE2_DEGREES: 92
OS_K2POWER_DIOPTERS: 43.25
OD_K2POWER_DIOPTERS: 43.25
OS_AXISANGLE2_DEGREES: 92
OD_AXISANGLE2_DEGREES: 90
OS_AXISANGLE_DEGREES: 2
OD_K1POWER_DIOPTERS: 42.25
OD_K2POWER_DIOPTERS: 43.25
OD_AXISANGLE_DEGREES: 180
OD_K1POWER_DIOPTERS: 42.25
OS_AXISANGLE_DEGREES: 2
OS_K2POWER_DIOPTERS: 43.25

## 2022-02-02 ASSESSMENT — VISUAL ACUITY: OD_SC: 20/25-1

## 2022-02-09 ENCOUNTER — 1 WEEK POST-OP (OUTPATIENT)
Dept: URBAN - METROPOLITAN AREA CLINIC 6 | Facility: CLINIC | Age: 57
End: 2022-02-09

## 2022-02-09 DIAGNOSIS — Z96.1: ICD-10-CM

## 2022-02-09 DIAGNOSIS — H25.812: ICD-10-CM

## 2022-02-09 PROCEDURE — 99024 POSTOP FOLLOW-UP VISIT: CPT

## 2022-02-09 PROCEDURE — 92136 OPHTHALMIC BIOMETRY: CPT | Mod: 26,LT

## 2022-02-09 ASSESSMENT — KERATOMETRY
OD_AXISANGLE_DEGREES: 180
OD_K1POWER_DIOPTERS: 42.25
OS_K2POWER_DIOPTERS: 43.25
OS_K1POWER_DIOPTERS: 42.25
OD_AXISANGLE2_DEGREES: 90
OD_K2POWER_DIOPTERS: 43.25
OS_AXISANGLE_DEGREES: 2
OS_AXISANGLE2_DEGREES: 92

## 2022-02-09 ASSESSMENT — TONOMETRY
OD_IOP_MMHG: 17
OS_IOP_MMHG: 13

## 2022-02-09 ASSESSMENT — VISUAL ACUITY
OS_PH: 20/30
OD_SC: 20/30
OS_SC: 20/80

## 2022-02-15 ENCOUNTER — SURGERY/PROCEDURE (OUTPATIENT)
Dept: URBAN - METROPOLITAN AREA SURGICAL CENTER 6 | Facility: SURGICAL CENTER | Age: 57
End: 2022-02-15

## 2022-02-15 DIAGNOSIS — H25.812: ICD-10-CM

## 2022-02-15 PROCEDURE — 66984 XCAPSL CTRC RMVL W/O ECP: CPT | Mod: 79,LT

## 2022-02-16 ENCOUNTER — 1 DAY POST-OP (OUTPATIENT)
Dept: URBAN - METROPOLITAN AREA CLINIC 6 | Facility: CLINIC | Age: 57
End: 2022-02-16

## 2022-02-16 DIAGNOSIS — Z96.1: ICD-10-CM

## 2022-02-16 PROCEDURE — 99024 POSTOP FOLLOW-UP VISIT: CPT

## 2022-02-16 ASSESSMENT — KERATOMETRY
OS_K2POWER_DIOPTERS: 43.25
OS_K2POWER_DIOPTERS: 43.25
OS_AXISANGLE2_DEGREES: 92
OD_K1POWER_DIOPTERS: 42.25
OS_AXISANGLE_DEGREES: 2
OS_AXISANGLE2_DEGREES: 92
OD_AXISANGLE_DEGREES: 180
OD_AXISANGLE2_DEGREES: 90
OD_K2POWER_DIOPTERS: 43.25
OS_K1POWER_DIOPTERS: 42.25
OD_AXISANGLE2_DEGREES: 90
OD_K2POWER_DIOPTERS: 43.25
OS_AXISANGLE_DEGREES: 2
OS_K1POWER_DIOPTERS: 42.25
OD_K1POWER_DIOPTERS: 42.25
OD_AXISANGLE_DEGREES: 180

## 2022-02-16 ASSESSMENT — VISUAL ACUITY
OS_SC: 20/30-1
OD_SC: 20/20
OS_PH: 20/25-1
OU_SC: J3

## 2022-02-16 ASSESSMENT — TONOMETRY
OD_IOP_MMHG: 16
OS_IOP_MMHG: 28
OS_IOP_MMHG: 41

## 2022-02-23 ENCOUNTER — 1 WEEK POST-OP (OUTPATIENT)
Dept: URBAN - METROPOLITAN AREA CLINIC 6 | Facility: CLINIC | Age: 57
End: 2022-02-23

## 2022-02-23 DIAGNOSIS — Z96.1: ICD-10-CM

## 2022-02-23 PROCEDURE — 99024 POSTOP FOLLOW-UP VISIT: CPT

## 2022-02-23 ASSESSMENT — VISUAL ACUITY
OU_SC: J1
OS_SC: 20/25+2
OD_SC: 20/20

## 2022-02-23 ASSESSMENT — TONOMETRY
OS_IOP_MMHG: 10
OD_IOP_MMHG: 8

## 2022-02-23 ASSESSMENT — KERATOMETRY
OD_AXISANGLE2_DEGREES: 90
OD_K1POWER_DIOPTERS: 42.25
OD_K2POWER_DIOPTERS: 43.25
OD_AXISANGLE_DEGREES: 180
OS_K1POWER_DIOPTERS: 42.25
OS_AXISANGLE_DEGREES: 2
OS_AXISANGLE2_DEGREES: 92
OS_K2POWER_DIOPTERS: 43.25

## 2023-11-27 ENCOUNTER — APPOINTMENT (OUTPATIENT)
Dept: URGENT CARE | Facility: MEDICAL CENTER | Age: 58
End: 2023-11-27

## 2023-12-22 ENCOUNTER — OCCMED (OUTPATIENT)
Dept: URGENT CARE | Facility: MEDICAL CENTER | Age: 58
End: 2023-12-22

## 2023-12-22 DIAGNOSIS — Z02.83 ENCOUNTER FOR DRUG SCREENING: Primary | ICD-10-CM

## 2025-01-24 ENCOUNTER — APPOINTMENT (OUTPATIENT)
Dept: RADIOLOGY | Age: 60
End: 2025-01-24
Payer: COMMERCIAL

## 2025-01-24 VITALS — WEIGHT: 315 LBS | HEIGHT: 72 IN | BODY MASS INDEX: 42.66 KG/M2

## 2025-01-24 DIAGNOSIS — Z96.641 HISTORY OF RIGHT HIP REPLACEMENT: ICD-10-CM

## 2025-01-24 DIAGNOSIS — Z96.641 HISTORY OF RIGHT HIP REPLACEMENT: Primary | ICD-10-CM

## 2025-01-24 PROCEDURE — 73502 X-RAY EXAM HIP UNI 2-3 VIEWS: CPT

## 2025-01-24 PROCEDURE — 99203 OFFICE O/P NEW LOW 30 MIN: CPT | Performed by: STUDENT IN AN ORGANIZED HEALTH CARE EDUCATION/TRAINING PROGRAM

## 2025-01-24 RX ORDER — ESCITALOPRAM OXALATE 20 MG/1
20 TABLET ORAL DAILY
COMMUNITY
Start: 2025-01-09 | End: 2026-01-09

## 2025-01-24 RX ORDER — LORAZEPAM 0.5 MG/1
TABLET ORAL
COMMUNITY
Start: 2024-12-27

## 2025-01-24 RX ORDER — OXYCODONE HYDROCHLORIDE 5 MG/1
5 TABLET ORAL EVERY 6 HOURS PRN
COMMUNITY
Start: 2025-01-21

## 2025-01-24 NOTE — PROGRESS NOTES
"      Date: 25  Luis A Krishnamurthy   MRN# [id number]  : 1965      Chief Complaint: right hip/leg pain.    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  It was explained that the anterior total hip approach does have a slightly higher risk of femoral nerve injury compared to other approaches.  It was also explained that the nerve can take up to 18 months to heal, and until then, the patient may be left with the inability to extend the knee actively.  Until then, taking a \"watch and wait\" approach is a reasonable treatment plan  -Weightbearing as tolerated right lower extremity  -May continue ambulation with the assistance of a cane  -Discussed the utility of possibly obtaining an active assisted knee brace for ambulation  -Over-the-counter anti-inflammatories and/or Tylenol for any residual discomfort  -Patient welcome to follow-up on an as-needed basis         Subjective:     Hip Pain  Patient complains of right hip pain. He had a right JUAN DIEGO on 10/1/24 with Dr. Maldonado at [facility].  This is evaluated as a personal injury. The pain began 4 months ago shortly after surgery. The pain is located diffuse and lateral and is made worse with walking and standing.  He describes the symptoms as burning, shooting, and tingling. He claims that he is unable to extend his knee and flex his hip. He needs assistance getting in and out of a car for example. He claims that his knee will give out while ambulating.       External Records Reviewed: historical medical records and radiology reports    Allergy:  Allergies   Allergen Reactions    Nsaids Other (See Comments)    Lisinopril Cough     Medications:  all current active meds " have been reviewed  Past Medical History:  Past Medical History:   Diagnosis Date    Diverticulitis      Past Surgical History:  Past Surgical History:   Procedure Laterality Date    ABDOMINAL SURGERY      COLON SURGERY       Family History:  History reviewed. No pertinent family history.  Social History:  Social History     Substance and Sexual Activity   Alcohol Use No    Comment: Occ     Social History     Substance and Sexual Activity   Drug Use No     Social History     Tobacco Use   Smoking Status Former   Smokeless Tobacco Current           ROS:   Review of Systems   All other systems reviewed and are negative.       Objective:   BP Readings from Last 1 Encounters:   06/06/19 155/99      Wt Readings from Last 1 Encounters:   01/24/25 (!) 153 kg (338 lb)      Pulse Readings from Last 1 Encounters:   06/06/19 70      BMI: Estimated body mass index is 45.84 kg/m² as calculated from the following:    Height as of this encounter: 6' (1.829 m).    Weight as of this encounter: 153 kg (338 lb).      Physical Exam  Constitutional:       General: He is not in acute distress.  HENT:      Head: Normocephalic and atraumatic.   Eyes:      Conjunctiva/sclera: Conjunctivae normal.   Cardiovascular:      Comments: Extremities well perfused   No LE edema    Pulmonary:      Effort: Pulmonary effort is normal.   Abdominal:      General: Abdomen is flat. Bowel sounds are normal.   Neurological:      Mental Status: He is alert. Mental status is at baseline.          Gait and Station:   antalgic and stiff-legged      Right Hip     Inspection: Incision well-healed    Range of Motion: Painless passive range of motion    negative  log roll  negative Trendelenburg sign  negative  Stinchfield  negative  JULIUS  negative  FADDIR    Motor: 5/5 HS/TA/GS/EHL/FHL, 0/5 knee extension    Vascular: Toes WWP with BCR    SILT DP/SP/Suzanne/Saph/Tib    Images:    I personally reviewed relevant images in the PACS system and my interpretation is as  follows:  Radiographs of the right hip demonstrate a total hip arthroplasty in appropriate alignment without evidence of loosening, wear or subsidence      Clifford Medina MD  Adult Reconstruction Specialist   [facility]

## 2025-01-27 NOTE — ASSESSMENT & PLAN NOTE
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  It was explained that the anterior total hip approach does have a slightly higher risk of femoral nerve injury compared to other approaches.  It was also explained that the nerve can take up to 18 months to heal, and until then, the patient may be left with the inability to extend the knee actively.  Until then, taking a \"watch and wait\" approach is a reasonable treatment plan  -Weightbearing as tolerated right lower extremity  -May continue ambulation with the assistance of a cane  -Discussed the utility of possibly obtaining an active assisted knee brace for ambulation  -Over-the-counter anti-inflammatories and/or Tylenol for any residual discomfort  -Patient welcome to follow-up on an as-needed basis  "

## 2025-02-25 ENCOUNTER — OFFICE VISIT (OUTPATIENT)
Dept: PHYSICAL THERAPY | Facility: CLINIC | Age: 60
End: 2025-02-25
Payer: COMMERCIAL

## 2025-02-25 DIAGNOSIS — H81.10 BENIGN PAROXYSMAL POSITIONAL VERTIGO, UNSPECIFIED LATERALITY: Primary | ICD-10-CM

## 2025-02-25 PROCEDURE — 97162 PT EVAL MOD COMPLEX 30 MIN: CPT | Performed by: PHYSICAL THERAPIST

## 2025-02-25 PROCEDURE — 97110 THERAPEUTIC EXERCISES: CPT | Performed by: PHYSICAL THERAPIST

## 2025-02-25 NOTE — LETTER
2025    Sanam Zuniga Demetrice, DO  [address] [facility] [address]    Patient: Luis A Krishnamurthy   YOB: 1965   Date of Visit: 2025     Encounter Diagnosis     ICD-10-CM    1. Benign paroxysmal positional vertigo, unspecified laterality  H81.10           Dear Dr. Suresh:    Thank you for your recent referral of Luis A Krishnamurthy. Please review the attached evaluation summary from Luis A's recent visit.     Please verify that you agree with the plan of care by signing the attached order.     If you have any questions or concerns, please do not hesitate to call.     I sincerely appreciate the opportunity to share in the care of one of your patients and hope to have another opportunity to work with you in the near future.       Sincerely,    Rio Pichardo, PT      Referring Provider:      I certify that I have read the below Plan of Care and certify the need for these services furnished under this plan of treatment while under my care.                    Sanam Zuniga Demetrice, DO  [address] [facility] [address]  Via Fax: [phone]          PT Evaluation     Today's date: 2025  Patient name: Luis A Krishnamurthy  : 1965  MRN: [id number]  Referring provider: Sanam Suresh  Dx:   Encounter Diagnosis     ICD-10-CM    1. Benign paroxysmal positional vertigo, unspecified laterality  H81.10                      Assessment  Impairments: activity intolerance  Symptom irritability: low    Assessment details: Pt presents with what appears to be resolving left posterior canal BPPV.    Understanding of Dx/Px/POC: good     Prognosis: good    Goals  ST) Pt will experience no vertigo in the morning when waking up and getting out of bed in 2 weeks.  2) Pt will be able to navigate his home without any dizziness or loss of balance in 2 weeks.  LT) Pt will have complete elimination of vertigo and dizziness throughout all ADL's in 4 weeks.  2)  Pt will be able to  "drive and complete a full day of work without any symptoms in 4 weeks.    Plan  Patient would benefit from: skilled physical therapy and PT eval  Referral necessary: No    Planned therapy interventions: manual therapy, neuromuscular re-education, patient/caregiver education, self care and home exercise program    Frequency: 1x week  Duration in weeks: 4  Treatment plan discussed with: patient        Subjective Evaluation    History of Present Illness  Date of onset: 2/21/2025  Mechanism of injury: Pt reports having rapid and intense onset of vertigo when getting up from bed in the morning.  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  Pain  Relieving factors: change in position      Diagnostic Tests  CT scan: normal (head)    FCE comments: Pt lost his job as a [employer] due to his hip complications.Treatments  Previous treatment: physical therapy  Discharged from (in last 30 days): inpatient hospitalization        Objective     Concurrent Complaints  Negative for headaches  Neuro Exam:     Dizziness  Positive for disequilibrium and vertigo.     Exacerbating factors  Positive for rolling in bed and supine to/from sitting.     Headaches   Patient reports headaches: No.     Cervical exam   Modified VBI   Left: asymptomatic  Right: asymptomatic  Seated posture: forward head posture    Oculomotor exam   Oculomotor ROM: WNL  Resting nystagmus: not present   Gaze holding nystagmus: not present left  and not present right  Smooth pursuits: within normal limits  Vertical saccades: normal  Horizontal saccades: normal  Convergence: 6\", WNL w/ reading glasses  Convergence: abnormal  Cover test: normal  Crossover test: normal  Head thrust: left normal and right normal  Dynamic visual acuity: normal  Dynamic head: WNL  Static head: WNL    Positional testing   Charisse-Hallpike   Left " posterior canal: symptomatic  Duration: 4 (seconds)    Coordination   Heel to shin: left WNL and right WNL  Finger to nose: left WNL and right WNL  Rapid alternating movements: LE WNL and UE WNL           Access Code: [email or web address]      Precautions HTN       Manuals 2-25-25                                       Neuro Re-Ed                                                                 Ther Ex                                                        Pt Ed/ HEP Pathology and involved anatomy as well as issuing and reviewing HEP-15'               Ther Activity                        Gait Training                        Modalities

## 2025-02-25 NOTE — PROGRESS NOTES
PT Evaluation     Today's date: 2025  Patient name: Luis A Krishnamurthy  : 1965  MRN: [id number]  Referring provider: Sanam Suresh  Dx:   Encounter Diagnosis     ICD-10-CM    1. Benign paroxysmal positional vertigo, unspecified laterality  H81.10                      Assessment  Impairments: activity intolerance  Symptom irritability: low    Assessment details: Pt presents with what appears to be resolving left posterior canal BPPV.    Understanding of Dx/Px/POC: good     Prognosis: good    Goals  ST) Pt will experience no vertigo in the morning when waking up and getting out of bed in 2 weeks.  2) Pt will be able to navigate his home without any dizziness or loss of balance in 2 weeks.  LT) Pt will have complete elimination of vertigo and dizziness throughout all ADL's in 4 weeks.  2)  Pt will be able to drive and complete a full day of work without any symptoms in 4 weeks.    Plan  Patient would benefit from: skilled physical therapy and PT eval  Referral necessary: No    Planned therapy interventions: manual therapy, neuromuscular re-education, patient/caregiver education, self care and home exercise program    Frequency: 1x week  Duration in weeks: 4  Treatment plan discussed with: patient        Subjective Evaluation    History of Present Illness  Date of onset: 2025  Mechanism of injury: Pt reports having rapid and intense onset of vertigo when getting up from bed in the morning.  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  Pain  Relieving factors: change in position      Diagnostic Tests  CT scan: normal (head)    FCE comments: Pt lost his job as a [employer] due to his hip complications.Treatments  Previous treatment: physical therapy  Discharged from (in last 30 days): inpatient hospitalization        Objective  "    Concurrent Complaints  Negative for headaches  Neuro Exam:     Dizziness  Positive for disequilibrium and vertigo.     Exacerbating factors  Positive for rolling in bed and supine to/from sitting.     Headaches   Patient reports headaches: No.     Cervical exam   Modified VBI   Left: asymptomatic  Right: asymptomatic  Seated posture: forward head posture    Oculomotor exam   Oculomotor ROM: WNL  Resting nystagmus: not present   Gaze holding nystagmus: not present left  and not present right  Smooth pursuits: within normal limits  Vertical saccades: normal  Horizontal saccades: normal  Convergence: 6\", WNL w/ reading glasses  Convergence: abnormal  Cover test: normal  Crossover test: normal  Head thrust: left normal and right normal  Dynamic visual acuity: normal  Dynamic head: WNL  Static head: WNL    Positional testing   Belcher-Hallpike   Left posterior canal: symptomatic  Duration: 4 (seconds)    Coordination   Heel to shin: left WNL and right WNL  Finger to nose: left WNL and right WNL  Rapid alternating movements: LE WNL and UE WNL           Access Code: [email or web address]      Precautions HTN       Manuals 2-25-25                                       Neuro Re-Ed                                                                 Ther Ex                                                        Pt Ed/ HEP Pathology and involved anatomy as well as issuing and reviewing HEP-15'               Ther Activity                        Gait Training                        Modalities                                 " saline lock